# Patient Record
Sex: FEMALE | Race: ASIAN | NOT HISPANIC OR LATINO | Employment: UNEMPLOYED | URBAN - METROPOLITAN AREA
[De-identification: names, ages, dates, MRNs, and addresses within clinical notes are randomized per-mention and may not be internally consistent; named-entity substitution may affect disease eponyms.]

---

## 2021-10-25 ENCOUNTER — TELEPHONE (OUTPATIENT)
Dept: OBSTETRICS AND GYNECOLOGY | Age: 31
End: 2021-10-25

## 2023-10-18 ENCOUNTER — OFFICE VISIT (OUTPATIENT)
Dept: OBSTETRICS AND GYNECOLOGY | Age: 33
End: 2023-10-18
Payer: COMMERCIAL

## 2023-10-18 VITALS
DIASTOLIC BLOOD PRESSURE: 70 MMHG | WEIGHT: 137.6 LBS | BODY MASS INDEX: 25.32 KG/M2 | SYSTOLIC BLOOD PRESSURE: 110 MMHG | HEIGHT: 62 IN

## 2023-10-18 DIAGNOSIS — Z11.3 ROUTINE SCREENING FOR STI (SEXUALLY TRANSMITTED INFECTION): ICD-10-CM

## 2023-10-18 DIAGNOSIS — Z13.89 SCREENING FOR BLOOD OR PROTEIN IN URINE: ICD-10-CM

## 2023-10-18 DIAGNOSIS — Z87.59 HISTORY OF PLACENTA ABRUPTION: ICD-10-CM

## 2023-10-18 DIAGNOSIS — R30.0 DYSURIA: ICD-10-CM

## 2023-10-18 DIAGNOSIS — Z98.891 PREVIOUS CESAREAN SECTION: ICD-10-CM

## 2023-10-18 DIAGNOSIS — O36.80X0 ENCOUNTER TO DETERMINE FETAL VIABILITY OF PREGNANCY, SINGLE OR UNSPECIFIED FETUS: ICD-10-CM

## 2023-10-18 DIAGNOSIS — Z3A.01 7 WEEKS GESTATION OF PREGNANCY: Primary | ICD-10-CM

## 2023-10-18 PROBLEM — O45.90 PLACENTAL ABRUPTION AFFECTING DELIVERY: Status: RESOLVED | Noted: 2021-11-01 | Resolved: 2023-10-18

## 2023-10-18 LAB
BILIRUB BLD-MCNC: NEGATIVE MG/DL
GLUCOSE UR STRIP-MCNC: NEGATIVE MG/DL
KETONES UR QL: NEGATIVE
LEUKOCYTE EST, POC: NEGATIVE
NITRITE UR-MCNC: NEGATIVE MG/ML
PH UR: 6 [PH] (ref 5–8)
PROT UR STRIP-MCNC: NEGATIVE MG/DL
RBC # UR STRIP: NEGATIVE /UL
SP GR UR: 1.03 (ref 1–1.03)
UROBILINOGEN UR QL: NORMAL

## 2023-10-19 LAB
ABO GROUP BLD: NORMAL
BLD GP AB SCN SERPL QL: NEGATIVE
ERYTHROCYTE [DISTWIDTH] IN BLOOD BY AUTOMATED COUNT: 13 % (ref 11.7–15.4)
HBA1C MFR BLD: 5 % (ref 4.8–5.6)
HBV SURFACE AG SERPL QL IA: NEGATIVE
HCT VFR BLD AUTO: 36.3 % (ref 34–46.6)
HCV IGG SERPL QL IA: NON REACTIVE
HGB A MFR BLD ELPH: 97.3 % (ref 96.4–98.8)
HGB A2 MFR BLD ELPH: 2.7 % (ref 1.8–3.2)
HGB BLD-MCNC: 12.2 G/DL (ref 11.1–15.9)
HGB F MFR BLD ELPH: 0 % (ref 0–2)
HGB FRACT BLD-IMP: NORMAL
HGB S MFR BLD ELPH: 0 %
HIV 1+2 AB+HIV1 P24 AG SERPL QL IA: NON REACTIVE
MCH RBC QN AUTO: 30.6 PG (ref 26.6–33)
MCHC RBC AUTO-ENTMCNC: 33.6 G/DL (ref 31.5–35.7)
MCV RBC AUTO: 91 FL (ref 79–97)
PLATELET # BLD AUTO: 217 X10E3/UL (ref 150–450)
RBC # BLD AUTO: 3.99 X10E6/UL (ref 3.77–5.28)
RH BLD: POSITIVE
RPR SER QL: NON REACTIVE
RUBV IGG SERPL IA-ACNC: 2.74 INDEX
TSH SERPL DL<=0.005 MIU/L-ACNC: 0.5 UIU/ML (ref 0.45–4.5)
VZV IGG SER IA-ACNC: 1805 INDEX
WBC # BLD AUTO: 7.3 X10E3/UL (ref 3.4–10.8)

## 2023-10-20 LAB
BACTERIA UR CULT: NO GROWTH
BACTERIA UR CULT: NORMAL
C TRACH RRNA SPEC QL NAA+PROBE: NEGATIVE
N GONORRHOEA RRNA SPEC QL NAA+PROBE: NEGATIVE
T VAGINALIS RRNA SPEC QL NAA+PROBE: NEGATIVE

## 2023-11-17 ENCOUNTER — INITIAL PRENATAL (OUTPATIENT)
Dept: OBSTETRICS AND GYNECOLOGY | Age: 33
End: 2023-11-17
Payer: COMMERCIAL

## 2023-11-17 VITALS — SYSTOLIC BLOOD PRESSURE: 100 MMHG | DIASTOLIC BLOOD PRESSURE: 68 MMHG | BODY MASS INDEX: 24.69 KG/M2 | WEIGHT: 135 LBS

## 2023-11-17 DIAGNOSIS — Z13.89 SCREENING FOR BLOOD OR PROTEIN IN URINE: ICD-10-CM

## 2023-11-17 DIAGNOSIS — Z98.891 PREVIOUS CESAREAN SECTION: ICD-10-CM

## 2023-11-17 DIAGNOSIS — Z34.81 PRENATAL CARE, SUBSEQUENT PREGNANCY IN FIRST TRIMESTER: Primary | ICD-10-CM

## 2023-11-17 DIAGNOSIS — Z87.59 HISTORY OF PLACENTA ABRUPTION: ICD-10-CM

## 2023-11-17 PROBLEM — Z34.90 PREGNANCY: Status: ACTIVE | Noted: 2023-11-17

## 2023-11-17 LAB
BILIRUB BLD-MCNC: NEGATIVE MG/DL
GLUCOSE UR STRIP-MCNC: NEGATIVE MG/DL
KETONES UR QL: ABNORMAL
LEUKOCYTE EST, POC: NEGATIVE
NITRITE UR-MCNC: NEGATIVE MG/ML
PH UR: 6 [PH] (ref 5–8)
PROT UR STRIP-MCNC: NEGATIVE MG/DL
RBC # UR STRIP: NEGATIVE /UL
SP GR UR: 1.01 (ref 1–1.03)
UROBILINOGEN UR QL: NORMAL

## 2023-11-17 NOTE — PROGRESS NOTES
Chief Complaint   Patient presents with    Routine Prenatal Visit     Cc:  ob intake Nipt undecided wants to hold off right now LMP 23  , carrier screening neg back in   C/o nausea takes unisom and b6 meds help   Had some vomiting last night   Denies any spotting or cramping   Flu vaccine today   Would like to have another csection again with this baby        HPI: 33 y.o.  at 11w3d presents for ob intake     Vitals:    23 0903   BP: 100/68   Weight: 61.2 kg (135 lb)     Total weight gain for pregnancy:  -0.907 kg (-2 lb)    Review of systems:   Gen: negative  CV:     negative  GI: negative  :   negative  MS:    negative  Neuro: negative  Pul: negative    A/P  1. Intrauterine pregnancy at 11w3d   2. Pregnancy Risk:  HIGH RISK        Diagnoses and all orders for this visit:    1. Prenatal care, subsequent pregnancy in first trimester (Primary)    2. Screening for blood or protein in urine  -     POC Urinalysis Dipstick    3. Previous  section    4. History of placenta abruption    Other orders  -     Fluzone (or Fluarix & Flulaval for VFC) >6mos        Nutrition and weight gain were addressed.  -----------------------  PLAN:   Return in about 31 days (around 2023), or ob check.  OB intake completed  Prenatal labs reviewed  Prior  section-desires repeat  Flu vaccine today  Undecided on NIPT         Meenu Villasenor MD  2023 09:29 EST

## 2023-12-18 ENCOUNTER — ROUTINE PRENATAL (OUTPATIENT)
Dept: OBSTETRICS AND GYNECOLOGY | Age: 33
End: 2023-12-18
Payer: COMMERCIAL

## 2023-12-18 VITALS — BODY MASS INDEX: 24.87 KG/M2 | DIASTOLIC BLOOD PRESSURE: 62 MMHG | SYSTOLIC BLOOD PRESSURE: 102 MMHG | WEIGHT: 136 LBS

## 2023-12-18 DIAGNOSIS — Z34.82 PRENATAL CARE, SUBSEQUENT PREGNANCY IN SECOND TRIMESTER: Primary | ICD-10-CM

## 2023-12-18 DIAGNOSIS — Z98.891 PREVIOUS CESAREAN SECTION: ICD-10-CM

## 2023-12-18 DIAGNOSIS — Z87.59 HISTORY OF PLACENTA ABRUPTION: ICD-10-CM

## 2023-12-18 DIAGNOSIS — Z13.89 SCREENING FOR BLOOD OR PROTEIN IN URINE: ICD-10-CM

## 2023-12-18 LAB
BILIRUB BLD-MCNC: NEGATIVE MG/DL
GLUCOSE UR STRIP-MCNC: NEGATIVE MG/DL
KETONES UR QL: NEGATIVE
LEUKOCYTE EST, POC: NEGATIVE
NITRITE UR-MCNC: NEGATIVE MG/ML
PH UR: 6.5 [PH] (ref 5–8)
PROT UR STRIP-MCNC: NEGATIVE MG/DL
RBC # UR STRIP: NEGATIVE /UL
SP GR UR: 1.01 (ref 1–1.03)
UROBILINOGEN UR QL: NORMAL

## 2023-12-18 NOTE — PROGRESS NOTES
Chief Complaint   Patient presents with    Routine Prenatal Visit     Cc: ob check , upd with flu vaccine , nausea decreased denies any vag spotting or cramping , declines kesha wants to wait till anatomy scan .       HPI: 33 y.o.  at 16w3d presents for prenatal care.  Reports feeling much better.    Vitals:    23 1135   BP: 102/62   Weight: 61.7 kg (136 lb)     Total weight gain for pregnancy:  -0.454 kg (-1 lb)    Review of systems:   Gen: negative  CV:     negative  GI: negative  :   negative  MS:    negative  Neuro: negative  Pul: negative    A/P  1. Intrauterine pregnancy at 16w3d   2. Pregnancy Risk:  NORMAL        Diagnoses and all orders for this visit:    1. Prenatal care, subsequent pregnancy in second trimester (Primary)    2. Screening for blood or protein in urine  -     POC Urinalysis Dipstick    3. Previous  section    4. History of placenta abruption        Nutrition and weight gain were addressed.  -----------------------  PLAN:   Return in about 4 weeks (around 1/15/2024), or ob check and anatomy US.  Declines NIPT  Previous  section-desires repeat at 39 weeks        Meenu Villasenor MD  2023 11:48 EST

## 2024-01-15 ENCOUNTER — HOSPITAL ENCOUNTER (OUTPATIENT)
Dept: ULTRASOUND IMAGING | Facility: HOSPITAL | Age: 34
Discharge: HOME | End: 2024-01-15
Admitting: OBSTETRICS & GYNECOLOGY
Payer: COMMERCIAL

## 2024-01-15 ENCOUNTER — TRANSCRIBE ORDERS (OUTPATIENT)
Dept: ULTRASOUND IMAGING | Facility: HOSPITAL | Age: 34
End: 2024-01-15
Payer: COMMERCIAL

## 2024-01-15 ENCOUNTER — ROUTINE PRENATAL (OUTPATIENT)
Dept: OBSTETRICS AND GYNECOLOGY | Age: 34
End: 2024-01-15
Payer: COMMERCIAL

## 2024-01-15 ENCOUNTER — OFFICE VISIT (OUTPATIENT)
Dept: OBSTETRICS AND GYNECOLOGY | Facility: CLINIC | Age: 34
End: 2024-01-15
Payer: COMMERCIAL

## 2024-01-15 VITALS
WEIGHT: 144 LBS | DIASTOLIC BLOOD PRESSURE: 63 MMHG | TEMPERATURE: 97.8 F | BODY MASS INDEX: 26.5 KG/M2 | HEIGHT: 62 IN | SYSTOLIC BLOOD PRESSURE: 109 MMHG | HEART RATE: 95 BPM

## 2024-01-15 VITALS — WEIGHT: 142 LBS | DIASTOLIC BLOOD PRESSURE: 64 MMHG | SYSTOLIC BLOOD PRESSURE: 104 MMHG | BODY MASS INDEX: 25.97 KG/M2

## 2024-01-15 DIAGNOSIS — N88.3 SHORT CERVIX: Primary | ICD-10-CM

## 2024-01-15 DIAGNOSIS — Z98.891 PREVIOUS CESAREAN SECTION: ICD-10-CM

## 2024-01-15 DIAGNOSIS — Z87.59 HISTORY OF PLACENTA ABRUPTION: ICD-10-CM

## 2024-01-15 DIAGNOSIS — Z34.82 PRENATAL CARE, SUBSEQUENT PREGNANCY IN SECOND TRIMESTER: Primary | ICD-10-CM

## 2024-01-15 DIAGNOSIS — O26.879 SHORT CERVIX AFFECTING PREGNANCY: ICD-10-CM

## 2024-01-15 DIAGNOSIS — O26.879 SHORT CERVIX AFFECTING PREGNANCY: Primary | ICD-10-CM

## 2024-01-15 DIAGNOSIS — N88.3 SHORT CERVIX: ICD-10-CM

## 2024-01-15 DIAGNOSIS — Z13.89 SCREENING FOR BLOOD OR PROTEIN IN URINE: ICD-10-CM

## 2024-01-15 PROCEDURE — 76817 TRANSVAGINAL US OBSTETRIC: CPT

## 2024-01-15 PROCEDURE — 0502F SUBSEQUENT PRENATAL CARE: CPT | Performed by: OBSTETRICS & GYNECOLOGY

## 2024-01-15 PROCEDURE — 76811 OB US DETAILED SNGL FETUS: CPT

## 2024-01-15 RX ORDER — PROGESTERONE 200 MG/1
200 CAPSULE ORAL
Qty: 90 CAPSULE | Refills: 5 | Status: SHIPPED | OUTPATIENT
Start: 2024-01-15

## 2024-01-15 NOTE — PROGRESS NOTES
Pt reports that she is doing well and denies vaginal bleeding, cramping, contractions or LOF at this time. Reports feeling flutters at this point in pregnancy. Reviewed when to call OB office or present to L&D for evaluation with symptoms such as decreased fetal movement, vaginal bleeding, LOF or ctxs. Pt verbalized understanding. Denies HA, visual changes or epigastric pain. Denies any additional complaints at time of appointment. Next OB appointment scheduled for 2/9.    Vitals:    01/15/24 1025   Temp: 97.8 °F (36.6 °C)

## 2024-01-15 NOTE — LETTER
January 15, 2024     Meenu Villasenor MD  [address] [facility]  [address] [place] [address]    Patient: Marysol HILLIARD   YOB: 1990   Date of Visit: 1/15/2024       Dear Meenu Villasenor MD,    Thank you for referring Marysol HILLIARD to me for evaluation. Below is a copy of my consult note.    If you have questions, please do not hesitate to call me. I look forward to following Marysol along with you.         Sincerely,        Zuleyka Lin MD    January 15, 2024     Meenu Villasenor MD  [address] [id number]    Patient: Marysol HILLIARD   YOB: 1990   Date of Visit: 1/15/2024       Dear Meenu Villasenor MD,    Thank you for referring Marysol HILLIARD to me for evaluation. Below is a copy of my consult note.    If you have questions, please do not hesitate to call me. I look forward to following Marysol along with you.         Sincerely,        Zuleyka Lin MD      MATERNAL FETAL MEDICINE Consult Note    Dear Dr Meenu Villasenor MD:    Thank you for your kind referral of Marysol HILLIARD.  As you know, she is a 33 y.o.   at  20 3/7 weeks gestation (Estimated Date of Delivery: 24). This is a consult.      Her antepartum course is complicated by:  Short cervix, hx of term delivery    Aneuploidy Screening: none seen    HPI: Today, she denies headache, blurry vision, RUQ pain. No vaginal bleeding, no contractions.     Review of History:  Past Medical History:   Diagnosis Date   • Placental abruption      Past Surgical History:   Procedure Laterality Date   •  SECTION N/A 10/20/2021    Procedure:  SECTION PRIMARY;  Surgeon: Meenu Villasenor MD;  Location: [facility] DELIVERY;  Service: Obstetrics/Gynecology;  Laterality: N/A;   • WISDOM TOOTH EXTRACTION         Social History     Socioeconomic History   • Marital status:    Tobacco Use   • Smoking status: Never     Passive exposure: Never   • Smokeless tobacco: Never  "  Vaping Use   • Vaping Use: Never used   Substance and Sexual Activity   • Alcohol use: Not Currently   • Drug use: Never   • Sexual activity: Yes     Partners: Male     Birth control/protection: None     Comment: no BC     Family History   Problem Relation Age of Onset   • Breast cancer Neg Hx    • Ovarian cancer Neg Hx    • Uterine cancer Neg Hx    • Colon cancer Neg Hx       Allergies   Allergen Reactions   • Adhesive Tape Irritability      Current Outpatient Medications on File Prior to Visit   Medication Sig Dispense Refill   • doxylamine (UNISOM) 25 MG tablet Take 1 tablet by mouth At Night As Needed for Sleep.     • prenatal vitamin (prenatal, CLASSIC, vitamin) tablet Take  by mouth Daily.       No current facility-administered medications on file prior to visit.        Past obstetric, gynecological, medical, surgical, family and social history reviewed.  Relevant lab work and imaging reviewed.    Review of systems  Constitutional:  denies fever, chills, malaise.   ENT/Mouth:  denies sore throat, tinnitus  Eyes: denies vision changes/pain  CV:  denies chest pain  Respiratory:  denies cough/SOB  GI:  denies N/V, diarrhea, abdominal pain.    :   denies dysuria  Skin:  denies lesions or pruritus   Neuro:  denies weakness, focal neurologic symptoms    Vitals:    01/15/24 1025   BP: 109/63   BP Location: Right arm   Patient Position: Sitting   Pulse: 95   Temp: 97.8 °F (36.6 °C)   TempSrc: Temporal   Weight: 65.3 kg (144 lb)   Height: 157.5 cm (62\")       PHYSICAL EXAM   GENERAL: Not in acute distress, AAOx3, pleasant  CARDIO: regular rate and rhythm  PULM: symmetric chest rise, speaking in complete sentences without difficulty  NEURO: awake, alert and oriented to person, place, and time  ABDOMINAL: No fundal tenderness, no rebound or guarding, gravid  EXTREMITIES: no bilateral lower extremity edema/tenderness  SKIN: Warm, well-perfused      ULTRASOUND   Please view full ultrasound note on Imaging tab in " ViewPoint.  Breech presentation.  Posterior placenta.    g (AC 51%)  Normal appearing anatomy with suboptimal spine and kidneys due to fetal position.  Cervical length 1.8 cm with funnel.     ASSESSMENT/COUNSELIN y.o.   at  20 3/7 weeks gestation (Estimated Date of Delivery: 24).     -Pregnancy  [ X ] stable  [   ] improving [  ] worsening    There are no diagnoses linked to this encounter.       Short cervix, 1.8 cm today in our office, ~7 mm in Dr. Villasenor's office upon reviewing images.   I used the [employer] website calculator taking into account maternal history and cervical length.  Below are the likelihood of her delivering  based on these indicators.  Her cervix is very dynamic so used shortest here and and Dr. Villasenor's office in counseling.      7 mm cervix:  Risk of delivery before 28 weeks:  8.3 %  Risk of delivery before 31 weeks:  12.4 %  Risk of delivery before 34 weeks:  19.3 %  Risk of delivery before 37 weeks:  43.4 %    1.8 mm cervix:  Risk of delivery before 28 weeks:  0.6 %  Risk of delivery before 31 weeks:  1.5 %  Risk of delivery before 34 weeks:  3.3 %  Risk of delivery before 37 weeks:  8.9 %    According to ACOG, incidentally detected short cervical length in the second trimester in the absence of a prior madsen  birth is not diagnostic of cervical insufficiency, and cerclage is not indicated in this setting. Vaginal progesterone is recommended as a management option to reduce the risk of  birth in asymptomatic women with a madsen gestation without a prior  birth with an incidentally identified very short cervical length. HOWEVER, we discussed based on her cervix <1 cm at Dr. Villasenor's office there is some data to offering cerclage for this indication and I would offer given what I am seeing on US, understanding that she doesn't meet the strict criteria.  We had a long discussion about cerclage vs surveillance.  We did discuss the  cerclage procedure and risks associated.  I discussed risks of a cerclage including risk of bleeding, infection, damage to bowel/bladder, PPROM, fetal demise.  Certainly, if her cervix continues to shorten, I would recommend weighing the risk of a more difficult/risky cerclage with risk of unnecessary procedure.   We discussed modified bedrest, activity restriction, pelvic rest.  She will do vaginal progesterone starting today.    They asked very good questions and we had a long discussion.  They had a good understanding of the fact that I am reassured by her term delivery last time, but her funnel down to at lowest 7 mm is concerning to me today--although we are getting 1.8 mm.  She did agree to start vaginal progesterone and understands modified activity.  She will have a very low threshold to come in and be seen.  We will see her short interval in a few days to get an idea of what the trajectory of her cervix is going to be and then plan surveillance based on that.  She understands the unlikely scenario that her cervix could shorten acutely/rapidly in the next couple days and lead to a previable delivery.  She understands surveillance for possible cerclage stops at 24 weeks.  I recommend modified activity with vaginal progesterone until 37 weeks.       Of all women with asymptomatic short cervix <15 mm in the midtrimester, only approximately 20% deliver , especially when it is dynamic.   She is picking up progesterone from [facility] before she leaves today.  All her questions were answered.      I did perform an exam for completeness.  Her cervix appeared closed on speculum exam without evidence of bag, etc.  She was digitally closed, as well, with cervix that did not feel soft, which gave me a small amount of reassurance.      Summary of Plan  -Discussed modified bedrest until 37 weeks, pelvic rest, as well  -Started vaginal progesterone today.   -Return short interval on Thursday to evaluate  cervix and decided management plan--if stable to increased CL, will do weekly     Follow-up: Thursday    Thank you for the consult and opportunity to care for this patient.  Please feel free to reach out with any questions or concerns.      I spent 40 minutes caring for this patient on this date of service. This time includes time spent by me in the following activities: preparing for the visit, reviewing tests, obtaining and/or reviewing a separately obtained history, performing a medically appropriate examination and/or evaluation, counseling and educating the patient/family/caregiver and independently interpreting results and communicating that information with the patient/family/caregiver with greater than 50% spent in counseling and coordination of care.       I spent 4 minutes on the separately reported service of US imaging not included in the time used to support the E/M service also reported today.      Zuleyka Lin MD FACOG  Maternal Fetal Medicine-[facility]  Office: [phone]  [email or web address]

## 2024-01-15 NOTE — PROGRESS NOTES
Chief Complaint   Patient presents with    Routine Prenatal Visit     Cc:   ob check with anatomy doing well today , upd with flu vaccine , does not know the gender today got an envelope will do baby reveal to find out , had couple of episodes lower back pain in December was unable to move but its better now , nausea only when driving in the car .       HPI: 33 y.o.  at 20w3d presents for prenatal care    Vitals:    01/15/24 0923   BP: 104/64   Weight: 64.4 kg (142 lb)     Total weight gain for pregnancy:  2.268 kg (5 lb)    Review of systems:   Gen: negative  CV:     negative  GI: negative  :   negative and good fetal movement noted   MS:    negative  Neuro: negative  Pul: negative    A/P  1. Intrauterine pregnancy at 20w3d   2. Pregnancy Risk:  HIGH RISK        Diagnoses and all orders for this visit:    1. Prenatal care, subsequent pregnancy in second trimester (Primary)    2. Screening for blood or protein in urine  -     POC Urinalysis Dipstick    3. Short cervix affecting pregnancy  -     Ambulatory Referral to [facility]/Perinatology    4. Previous  section    5. History of placenta abruption          -----------------------  PLAN:   Return in about 25 days (around 2024), or ob check and repeat anatomy.  Short cervix today on ultrasound-discussed with [facility]-will see today  Normal but incomplete anatomy-repeat 4 weeks      Meenu Villasenor MD  1/15/2024 09:48 EST

## 2024-01-15 NOTE — PROGRESS NOTES
MATERNAL FETAL MEDICINE Consult Note    Dear Dr Meenu Villasenor MD:    Thank you for your kind referral of Marysol HILLIARD.  As you know, she is a 33 y.o.   at  20 3/7 weeks gestation (Estimated Date of Delivery: 24). This is a consult.      Her antepartum course is complicated by:  Short cervix, hx of term delivery    Aneuploidy Screening: none seen    HPI: Today, she denies headache, blurry vision, RUQ pain. No vaginal bleeding, no contractions.     Review of History:  Past Medical History:   Diagnosis Date    Placental abruption      Past Surgical History:   Procedure Laterality Date     SECTION N/A 10/20/2021    Procedure:  SECTION PRIMARY;  Surgeon: Meenu Villasenor MD;  Location: [facility] LABOR DELIVERY;  Service: Obstetrics/Gynecology;  Laterality: N/A;    WISDOM TOOTH EXTRACTION         Social History     Socioeconomic History    Marital status:    Tobacco Use    Smoking status: Never     Passive exposure: Never    Smokeless tobacco: Never   Vaping Use    Vaping Use: Never used   Substance and Sexual Activity    Alcohol use: Not Currently    Drug use: Never    Sexual activity: Yes     Partners: Male     Birth control/protection: None     Comment: no BC     Family History   Problem Relation Age of Onset    Breast cancer Neg Hx     Ovarian cancer Neg Hx     Uterine cancer Neg Hx     Colon cancer Neg Hx       Allergies   Allergen Reactions    Adhesive Tape Irritability      Current Outpatient Medications on File Prior to Visit   Medication Sig Dispense Refill    doxylamine (UNISOM) 25 MG tablet Take 1 tablet by mouth At Night As Needed for Sleep.      prenatal vitamin (prenatal, CLASSIC, vitamin) tablet Take  by mouth Daily.       No current facility-administered medications on file prior to visit.        Past obstetric, gynecological, medical, surgical, family and social history reviewed.  Relevant lab work and imaging reviewed.    Review of systems  Constitutional:   "denies fever, chills, malaise.   ENT/Mouth:  denies sore throat, tinnitus  Eyes: denies vision changes/pain  CV:  denies chest pain  Respiratory:  denies cough/SOB  GI:  denies N/V, diarrhea, abdominal pain.    :   denies dysuria  Skin:  denies lesions or pruritus   Neuro:  denies weakness, focal neurologic symptoms    Vitals:    01/15/24 1025   BP: 109/63   BP Location: Right arm   Patient Position: Sitting   Pulse: 95   Temp: 97.8 °F (36.6 °C)   TempSrc: Temporal   Weight: 65.3 kg (144 lb)   Height: 157.5 cm (62\")       PHYSICAL EXAM   GENERAL: Not in acute distress, AAOx3, pleasant  CARDIO: regular rate and rhythm  PULM: symmetric chest rise, speaking in complete sentences without difficulty  NEURO: awake, alert and oriented to person, place, and time  ABDOMINAL: No fundal tenderness, no rebound or guarding, gravid  EXTREMITIES: no bilateral lower extremity edema/tenderness  SKIN: Warm, well-perfused      ULTRASOUND   Please view full ultrasound note on Imaging tab in ViewPoint.  Breech presentation.  Posterior placenta.    g (AC 51%)  Normal appearing anatomy with suboptimal spine and kidneys due to fetal position.  Cervical length 1.8 cm with funnel.     ASSESSMENT/COUNSELIN y.o.   at  20 3/7 weeks gestation (Estimated Date of Delivery: 24).     -Pregnancy  [ X ] stable  [   ] improving [  ] worsening    There are no diagnoses linked to this encounter.       Short cervix, 1.8 cm today in our office, ~7 mm in Dr. Villasenor's office upon reviewing images.   I used the Fetal Foundation website calculator taking into account maternal history and cervical length.  Below are the likelihood of her delivering  based on these indicators.  Her cervix is very dynamic so used shortest here and and Dr. Villasenor's office in counseling.      7 mm cervix:  Risk of delivery before 28 weeks:  8.3 %  Risk of delivery before 31 weeks:  12.4 %  Risk of delivery before 34 weeks:  19.3 %  Risk of delivery " before 37 weeks:  43.4 %    1.8 mm cervix:  Risk of delivery before 28 weeks:  0.6 %  Risk of delivery before 31 weeks:  1.5 %  Risk of delivery before 34 weeks:  3.3 %  Risk of delivery before 37 weeks:  8.9 %    According to ACOG, incidentally detected short cervical length in the second trimester in the absence of a prior madsen  birth is not diagnostic of cervical insufficiency, and cerclage is not indicated in this setting. Vaginal progesterone is recommended as a management option to reduce the risk of  birth in asymptomatic women with a madsen gestation without a prior  birth with an incidentally identified very short cervical length. HOWEVER, we discussed based on her cervix <1 cm at Dr. Villasenor's office there is some data to offering cerclage for this indication and I would offer given what I am seeing on US, understanding that she doesn't meet the strict criteria.  We had a long discussion about cerclage vs surveillance.  We did discuss the cerclage procedure and risks associated.  I discussed risks of a cerclage including risk of bleeding, infection, damage to bowel/bladder, PPROM, fetal demise.  Certainly, if her cervix continues to shorten, I would recommend weighing the risk of a more difficult/risky cerclage with risk of unnecessary procedure.   We discussed modified bedrest, activity restriction, pelvic rest.  She will do vaginal progesterone starting today.    They asked very good questions and we had a long discussion.  They had a good understanding of the fact that I am reassured by her term delivery last time, but her funnel down to at lowest 7 mm is concerning to me today--although we are getting 1.8 mm.  She did agree to start vaginal progesterone and understands modified activity.  She will have a very low threshold to come in and be seen.  We will see her short interval in a few days to get an idea of what the trajectory of her cervix is going to be and then plan  surveillance based on that.  She understands the unlikely scenario that her cervix could shorten acutely/rapidly in the next couple days and lead to a previable delivery.  She understands surveillance for possible cerclage stops at 24 weeks.  I recommend modified activity with vaginal progesterone until 37 weeks.       Of all women with asymptomatic short cervix <15 mm in the midtrimester, only approximately 20% deliver , especially when it is dynamic.   She is picking up progesterone from [facility] before she leaves today.  All her questions were answered.      I did perform an exam for completeness.  Her cervix appeared closed on speculum exam without evidence of bag, etc.  She was digitally closed, as well, with cervix that did not feel soft, which gave me a small amount of reassurance.      Summary of Plan  -Discussed modified bedrest until 37 weeks, pelvic rest, as well  -Started vaginal progesterone today.   -Return short interval on Thursday to evaluate cervix and decided management plan--if stable to increased CL, will do weekly     Follow-up: Thursday    Thank you for the consult and opportunity to care for this patient.  Please feel free to reach out with any questions or concerns.      I spent 40 minutes caring for this patient on this date of service. This time includes time spent by me in the following activities: preparing for the visit, reviewing tests, obtaining and/or reviewing a separately obtained history, performing a medically appropriate examination and/or evaluation, counseling and educating the patient/family/caregiver and independently interpreting results and communicating that information with the patient/family/caregiver with greater than 50% spent in counseling and coordination of care.       I spent 4 minutes on the separately reported service of US imaging not included in the time used to support the E/M service also reported today.      Zuleyka Lin MD  [facility] [place]  Maternal Fetal Medicine-[facility]  Office: [phone]  [email or web address]

## 2024-01-18 ENCOUNTER — ANESTHESIA EVENT (OUTPATIENT)
Dept: PERIOP | Facility: HOSPITAL | Age: 34
End: 2024-01-18
Payer: COMMERCIAL

## 2024-01-18 ENCOUNTER — HOSPITAL ENCOUNTER (INPATIENT)
Facility: HOSPITAL | Age: 34
LOS: 1 days | Discharge: HOME | End: 2024-01-19
Attending: OBSTETRICS & GYNECOLOGY | Admitting: OBSTETRICS & GYNECOLOGY
Payer: COMMERCIAL

## 2024-01-18 ENCOUNTER — TRANSCRIBE ORDERS (OUTPATIENT)
Dept: ULTRASOUND IMAGING | Facility: HOSPITAL | Age: 34
End: 2024-01-18
Payer: COMMERCIAL

## 2024-01-18 ENCOUNTER — HOSPITAL ENCOUNTER (OUTPATIENT)
Dept: ULTRASOUND IMAGING | Facility: HOSPITAL | Age: 34
Discharge: HOME | End: 2024-01-18
Admitting: OBSTETRICS & GYNECOLOGY
Payer: COMMERCIAL

## 2024-01-18 ENCOUNTER — HOSPITAL ENCOUNTER (OUTPATIENT)
Facility: HOSPITAL | Age: 34
Setting detail: HOSPITAL OUTPATIENT SURGERY
End: 2024-01-18
Attending: OBSTETRICS & GYNECOLOGY | Admitting: OBSTETRICS & GYNECOLOGY
Payer: COMMERCIAL

## 2024-01-18 ENCOUNTER — OFFICE VISIT (OUTPATIENT)
Dept: OBSTETRICS AND GYNECOLOGY | Facility: CLINIC | Age: 34
End: 2024-01-18
Payer: COMMERCIAL

## 2024-01-18 VITALS
TEMPERATURE: 97.8 F | HEIGHT: 62 IN | DIASTOLIC BLOOD PRESSURE: 67 MMHG | WEIGHT: 139 LBS | SYSTOLIC BLOOD PRESSURE: 107 MMHG | BODY MASS INDEX: 25.58 KG/M2 | HEART RATE: 107 BPM

## 2024-01-18 DIAGNOSIS — N88.3 SHORT CERVIX: Primary | ICD-10-CM

## 2024-01-18 DIAGNOSIS — O26.879 SHORT CERVIX AFFECTING PREGNANCY: Primary | ICD-10-CM

## 2024-01-18 DIAGNOSIS — N88.3 SHORT CERVIX: ICD-10-CM

## 2024-01-18 LAB
ABO GROUP BLD: NORMAL
ALBUMIN SERPL-MCNC: 3.5 G/DL (ref 3.5–5.2)
ALBUMIN/GLOB SERPL: 1.3 G/DL
ALP SERPL-CCNC: 61 U/L (ref 39–117)
ALT SERPL W P-5'-P-CCNC: 6 U/L (ref 1–33)
ANION GAP SERPL CALCULATED.3IONS-SCNC: 10.4 MMOL/L (ref 5–15)
AST SERPL-CCNC: 11 U/L (ref 1–32)
BASOPHILS # BLD AUTO: 0.01 10*3/MM3 (ref 0–0.2)
BASOPHILS NFR BLD AUTO: 0.1 % (ref 0–1.5)
BILIRUB SERPL-MCNC: <0.2 MG/DL (ref 0–1.2)
BLD GP AB SCN SERPL QL: NEGATIVE
BUN SERPL-MCNC: 7 MG/DL (ref 6–20)
BUN/CREAT SERPL: 11.7 (ref 7–25)
CALCIUM SPEC-SCNC: 8.7 MG/DL (ref 8.6–10.5)
CHLORIDE SERPL-SCNC: 102 MMOL/L (ref 98–107)
CO2 SERPL-SCNC: 20.6 MMOL/L (ref 22–29)
CREAT SERPL-MCNC: 0.6 MG/DL (ref 0.57–1)
DEPRECATED RDW RBC AUTO: 45.6 FL (ref 37–54)
EGFRCR SERPLBLD CKD-EPI 2021: 122.5 ML/MIN/1.73
EOSINOPHIL # BLD AUTO: 0.02 10*3/MM3 (ref 0–0.4)
EOSINOPHIL NFR BLD AUTO: 0.3 % (ref 0.3–6.2)
ERYTHROCYTE [DISTWIDTH] IN BLOOD BY AUTOMATED COUNT: 13.4 % (ref 12.3–15.4)
GLOBULIN UR ELPH-MCNC: 2.7 GM/DL
GLUCOSE SERPL-MCNC: 97 MG/DL (ref 65–99)
HCT VFR BLD AUTO: 35.8 % (ref 34–46.6)
HGB BLD-MCNC: 12.1 G/DL (ref 12–15.9)
LYMPHOCYTES # BLD AUTO: 0.99 10*3/MM3 (ref 0.7–3.1)
LYMPHOCYTES NFR BLD AUTO: 12.7 % (ref 19.6–45.3)
MCH RBC QN AUTO: 31.4 PG (ref 26.6–33)
MCHC RBC AUTO-ENTMCNC: 33.8 G/DL (ref 31.5–35.7)
MCV RBC AUTO: 93 FL (ref 79–97)
MONOCYTES # BLD AUTO: 0.51 10*3/MM3 (ref 0.1–0.9)
MONOCYTES NFR BLD AUTO: 6.5 % (ref 5–12)
NEUTROPHILS NFR BLD AUTO: 6.25 10*3/MM3 (ref 1.7–7)
NEUTROPHILS NFR BLD AUTO: 80.1 % (ref 42.7–76)
PLATELET # BLD AUTO: 143 10*3/MM3 (ref 140–450)
PMV BLD AUTO: 9.7 FL (ref 6–12)
POTASSIUM SERPL-SCNC: 4.3 MMOL/L (ref 3.5–5.2)
PROT SERPL-MCNC: 6.2 G/DL (ref 6–8.5)
RBC # BLD AUTO: 3.85 10*6/MM3 (ref 3.77–5.28)
RH BLD: POSITIVE
SODIUM SERPL-SCNC: 133 MMOL/L (ref 136–145)
T&S EXPIRATION DATE: NORMAL
WBC NRBC COR # BLD AUTO: 7.8 10*3/MM3 (ref 3.4–10.8)

## 2024-01-18 PROCEDURE — 86850 RBC ANTIBODY SCREEN: CPT | Performed by: OBSTETRICS & GYNECOLOGY

## 2024-01-18 PROCEDURE — 86901 BLOOD TYPING SEROLOGIC RH(D): CPT | Performed by: OBSTETRICS & GYNECOLOGY

## 2024-01-18 PROCEDURE — 76815 OB US LIMITED FETUS(S): CPT

## 2024-01-18 PROCEDURE — 25010000002 CEFAZOLIN IN DEXTROSE 2-4 GM/100ML-% SOLUTION: Performed by: OBSTETRICS & GYNECOLOGY

## 2024-01-18 PROCEDURE — 85025 COMPLETE CBC W/AUTO DIFF WBC: CPT | Performed by: OBSTETRICS & GYNECOLOGY

## 2024-01-18 PROCEDURE — 86900 BLOOD TYPING SEROLOGIC ABO: CPT | Performed by: OBSTETRICS & GYNECOLOGY

## 2024-01-18 PROCEDURE — S0260 H&P FOR SURGERY: HCPCS | Performed by: OBSTETRICS & GYNECOLOGY

## 2024-01-18 PROCEDURE — 76817 TRANSVAGINAL US OBSTETRIC: CPT

## 2024-01-18 PROCEDURE — 80053 COMPREHEN METABOLIC PANEL: CPT | Performed by: OBSTETRICS & GYNECOLOGY

## 2024-01-18 RX ORDER — SODIUM CHLORIDE 0.9 % (FLUSH) 0.9 %
10 SYRINGE (ML) INJECTION AS NEEDED
Status: CANCELLED | OUTPATIENT
Start: 2024-01-18

## 2024-01-18 RX ORDER — ONDANSETRON 4 MG/1
8 TABLET, ORALLY DISINTEGRATING ORAL EVERY 8 HOURS PRN
Status: DISCONTINUED | OUTPATIENT
Start: 2024-01-18 | End: 2024-01-19 | Stop reason: HOSPADM

## 2024-01-18 RX ORDER — LIDOCAINE HYDROCHLORIDE 10 MG/ML
0.5 INJECTION, SOLUTION INFILTRATION; PERINEURAL ONCE AS NEEDED
Status: DISCONTINUED | OUTPATIENT
Start: 2024-01-18 | End: 2024-01-19 | Stop reason: HOSPADM

## 2024-01-18 RX ORDER — BISACODYL 10 MG
10 SUPPOSITORY, RECTAL RECTAL DAILY PRN
Status: DISCONTINUED | OUTPATIENT
Start: 2024-01-18 | End: 2024-01-19 | Stop reason: HOSPADM

## 2024-01-18 RX ORDER — CEFAZOLIN SODIUM 2 G/100ML
2000 INJECTION, SOLUTION INTRAVENOUS EVERY 8 HOURS
Status: DISCONTINUED | OUTPATIENT
Start: 2024-01-18 | End: 2024-01-19 | Stop reason: HOSPADM

## 2024-01-18 RX ORDER — CALCIUM CARBONATE 500 MG/1
2 TABLET, CHEWABLE ORAL DAILY PRN
Status: CANCELLED | OUTPATIENT
Start: 2024-01-18

## 2024-01-18 RX ORDER — LIDOCAINE HYDROCHLORIDE 10 MG/ML
0.5 INJECTION, SOLUTION INFILTRATION; PERINEURAL ONCE AS NEEDED
Status: CANCELLED | OUTPATIENT
Start: 2024-01-18

## 2024-01-18 RX ORDER — CEFAZOLIN SODIUM IN 0.9 % NACL 3 G/100 ML
3000 INTRAVENOUS SOLUTION, PIGGYBACK (ML) INTRAVENOUS EVERY 8 HOURS
Status: DISCONTINUED | OUTPATIENT
Start: 2024-01-18 | End: 2024-01-18

## 2024-01-18 RX ORDER — SODIUM CHLORIDE 0.9 % (FLUSH) 0.9 %
10 SYRINGE (ML) INJECTION EVERY 12 HOURS SCHEDULED
Status: DISCONTINUED | OUTPATIENT
Start: 2024-01-18 | End: 2024-01-19 | Stop reason: HOSPADM

## 2024-01-18 RX ORDER — DOCUSATE SODIUM 100 MG/1
100 CAPSULE, LIQUID FILLED ORAL 2 TIMES DAILY PRN
Status: DISCONTINUED | OUTPATIENT
Start: 2024-01-18 | End: 2024-01-19 | Stop reason: HOSPADM

## 2024-01-18 RX ORDER — BISACODYL 10 MG
10 SUPPOSITORY, RECTAL RECTAL DAILY PRN
Status: CANCELLED | OUTPATIENT
Start: 2024-01-18

## 2024-01-18 RX ORDER — SODIUM CHLORIDE 0.9 % (FLUSH) 0.9 %
10 SYRINGE (ML) INJECTION EVERY 12 HOURS SCHEDULED
Status: CANCELLED | OUTPATIENT
Start: 2024-01-18

## 2024-01-18 RX ORDER — CALCIUM CARBONATE 500 MG/1
2 TABLET, CHEWABLE ORAL DAILY PRN
Status: DISCONTINUED | OUTPATIENT
Start: 2024-01-18 | End: 2024-01-19 | Stop reason: HOSPADM

## 2024-01-18 RX ORDER — SODIUM CHLORIDE 9 MG/ML
40 INJECTION, SOLUTION INTRAVENOUS AS NEEDED
Status: DISCONTINUED | OUTPATIENT
Start: 2024-01-18 | End: 2024-01-19 | Stop reason: HOSPADM

## 2024-01-18 RX ORDER — DOCUSATE SODIUM 100 MG/1
100 CAPSULE, LIQUID FILLED ORAL 2 TIMES DAILY PRN
Status: CANCELLED | OUTPATIENT
Start: 2024-01-18

## 2024-01-18 RX ORDER — INDOMETHACIN 50 MG/1
50 CAPSULE ORAL EVERY 8 HOURS
Status: DISCONTINUED | OUTPATIENT
Start: 2024-01-18 | End: 2024-01-19 | Stop reason: HOSPADM

## 2024-01-18 RX ORDER — ONDANSETRON 4 MG/1
8 TABLET, ORALLY DISINTEGRATING ORAL EVERY 8 HOURS PRN
Status: CANCELLED | OUTPATIENT
Start: 2024-01-18

## 2024-01-18 RX ORDER — SODIUM CHLORIDE 9 MG/ML
40 INJECTION, SOLUTION INTRAVENOUS AS NEEDED
Status: CANCELLED | OUTPATIENT
Start: 2024-01-18

## 2024-01-18 RX ORDER — ACETAMINOPHEN 325 MG/1
650 TABLET ORAL EVERY 4 HOURS PRN
Status: DISCONTINUED | OUTPATIENT
Start: 2024-01-18 | End: 2024-01-19 | Stop reason: HOSPADM

## 2024-01-18 RX ORDER — SODIUM CHLORIDE 0.9 % (FLUSH) 0.9 %
10 SYRINGE (ML) INJECTION AS NEEDED
Status: DISCONTINUED | OUTPATIENT
Start: 2024-01-18 | End: 2024-01-19 | Stop reason: HOSPADM

## 2024-01-18 RX ORDER — ONDANSETRON 2 MG/ML
4 INJECTION INTRAMUSCULAR; INTRAVENOUS EVERY 8 HOURS PRN
Status: DISCONTINUED | OUTPATIENT
Start: 2024-01-18 | End: 2024-01-19 | Stop reason: HOSPADM

## 2024-01-18 RX ORDER — ONDANSETRON 2 MG/ML
4 INJECTION INTRAMUSCULAR; INTRAVENOUS EVERY 8 HOURS PRN
Status: CANCELLED | OUTPATIENT
Start: 2024-01-18

## 2024-01-18 RX ORDER — ACETAMINOPHEN 325 MG/1
650 TABLET ORAL EVERY 4 HOURS PRN
Status: CANCELLED | OUTPATIENT
Start: 2024-01-18

## 2024-01-18 RX ADMIN — INDOMETHACIN 50 MG: 50 CAPSULE ORAL at 14:50

## 2024-01-18 RX ADMIN — CEFAZOLIN SODIUM 2000 MG: 2 INJECTION, SOLUTION INTRAVENOUS at 22:36

## 2024-01-18 RX ADMIN — CEFAZOLIN SODIUM 2000 MG: 2 INJECTION, SOLUTION INTRAVENOUS at 14:49

## 2024-01-18 RX ADMIN — INDOMETHACIN 50 MG: 50 CAPSULE ORAL at 22:36

## 2024-01-18 NOTE — LETTER
2024     Meenu Villasenor MD  [address] [facility]  [address]  [place] [id number]    Patient: Marysol HILLIARD   YOB: 1990   Date of Visit: 2024       Dear Meenu Villasenor MD,    Thank you for referring Marysol HILLIARD to me for evaluation. Below is a copy of my consult note.    If you have questions, please do not hesitate to call me. I look forward to following Marysol along with you.         Sincerely,        Zuleyka Lin MD      MATERNAL FETAL MEDICINE Consult Note    Dear Dr Meenu Villasenor MD:    Thank you for your kind referral of Marysol HILLIARD.  As you know, she is a 33 y.o.   at  20 6/7 weeks gestation (Estimated Date of Delivery: 24). This is a consult.      Her antepartum course is complicated by:  Short cervix, hx of term delivery, progressively shortening    Aneuploidy Screening: none seen    HPI: Today, she denies headache, blurry vision, RUQ pain. No vaginal bleeding, no contractions.     Review of History:  Past Medical History:   Diagnosis Date   • Placental abruption      Past Surgical History:   Procedure Laterality Date   •  SECTION N/A 10/20/2021    Procedure:  SECTION PRIMARY;  Surgeon: Meenu Villasenor MD;  Location: [facility] LABOR DELIVERY;  Service: Obstetrics/Gynecology;  Laterality: N/A;   • WISDOM TOOTH EXTRACTION         Social History     Socioeconomic History   • Marital status:    Tobacco Use   • Smoking status: Never     Passive exposure: Never   • Smokeless tobacco: Never   Vaping Use   • Vaping Use: Never used   Substance and Sexual Activity   • Alcohol use: Not Currently   • Drug use: Never   • Sexual activity: Yes     Partners: Male     Birth control/protection: None     Comment: no BC     Family History   Problem Relation Age of Onset   • Breast cancer Neg Hx    • Ovarian cancer Neg Hx    • Uterine cancer Neg Hx    • Colon cancer Neg Hx       Allergies   Allergen Reactions   • Adhesive Tape  "Irritability      No current facility-administered medications on file prior to visit.     Current Outpatient Medications on File Prior to Visit   Medication Sig Dispense Refill   • doxylamine (UNISOM) 25 MG tablet Take 1 tablet by mouth At Night As Needed for Sleep.     • prenatal vitamin (prenatal, CLASSIC, vitamin) tablet Take  by mouth Daily.     • Progesterone (PROMETRIUM) 200 MG capsule Insert 1 capsule into the vagina every night at bedtime. 90 capsule 5        Past obstetric, gynecological, medical, surgical, family and social history reviewed.  Relevant lab work and imaging reviewed.    Review of systems  Constitutional:  denies fever, chills, malaise.   ENT/Mouth:  denies sore throat, tinnitus  Eyes: denies vision changes/pain  CV:  denies chest pain  Respiratory:  denies cough/SOB  GI:  denies N/V, diarrhea, abdominal pain.    :   denies dysuria  Skin:  denies lesions or pruritus   Neuro:  denies weakness, focal neurologic symptoms    Vitals:    24 1148   BP: 107/67   BP Location: Right arm   Patient Position: Sitting   Pulse: 107   Temp: 97.8 °F (36.6 °C)   TempSrc: Temporal   Weight: 63 kg (139 lb)   Height: 157.5 cm (62\")       PHYSICAL EXAM   GENERAL: Not in acute distress, AAOx3, pleasant  CARDIO: regular rate and rhythm  PULM: symmetric chest rise, speaking in complete sentences without difficulty  NEURO: awake, alert and oriented to person, place, and time  ABDOMINAL: No fundal tenderness, no rebound or guarding, gravid  EXTREMITIES: no bilateral lower extremity edema/tenderness  SKIN: Warm, well-perfused      ULTRASOUND   Please view full ultrasound note on Imaging tab in ViewPoint.  Cephalic presentation.  Posterior placenta.  MVP 4.3 cm, which is normal.   Follow up anatomy appears normal.   Cervical length 1.3 cm with funnel today.    ASSESSMENT/COUNSELIN y.o.   at  20 6/7 weeks gestation (Estimated Date of Delivery: 24).     -Pregnancy  [ X ] stable  [   ] improving " [  ] worsening    Diagnoses and all orders for this visit:    1. Short cervix affecting pregnancy (Primary)  -     Case Request    Other orders  -     Admit To Obstetrics Inpatient; Standing  -     Code Status and Medical Interventions:; Standing  -     Place Sequential Compression Device; Standing  -     Maintain Sequential Compression Device; Standing  -     Vital Signs q 4 while awake; Standing  -     Antepartum Patients  <24 Weeks - Document Fetal Heart Tones Daily and PRN.; Standing  -     Notify Provider (Specified); Standing  -     Notify Provider of Tachysystole (Per Hospital Algorithm); Standing  -     Notify Provider if Membranes Ruptured, Bleeding Greater Than 1 Pad Per Hour, Fetal Heart Tone Abnormality or Severe Pain; Standing  -     Weigh Patient Daily; Standing  -     CBC (No Diff); Standing  -     T Pallidum Antibody w/ reflex RPR; Standing  -     Type & Screen; Standing  -     Insert Peripheral IV; Standing  -     Saline Lock & Maintain IV Access; Standing  -     sodium chloride 0.9 % flush 10 mL  -     sodium chloride 0.9 % flush 10 mL  -     sodium chloride 0.9 % infusion 40 mL  -     lidocaine (XYLOCAINE) 1 % injection 0.5 mL  -     acetaminophen (TYLENOL) tablet 650 mg  -     calcium carbonate (TUMS) chewable tablet 500 mg (200 mg elemental)  -     ondansetron ODT (ZOFRAN-ODT) disintegrating tablet 8 mg  -     ondansetron (ZOFRAN) injection 4 mg  -     docusate sodium (COLACE) capsule 100 mg  -     bisacodyl (DULCOLAX) suppository 10 mg  -     External Uterine Contraction Monitoring only; Standing  -     NPO Diet NPO Type: Strict NPO; Standing       Short cervix, 1.3 cm today in our office, from 1.8 earlier in week.      According to ACOG, incidentally detected short cervical length in the second trimester in the absence of a prior madsen  birth is not diagnostic of cervical insufficiency, and cerclage is not indicated in this setting. Vaginal progesterone is recommended as a  management option to reduce the risk of  birth in asymptomatic women with a madsen gestation without a prior  birth with an incidentally identified very short cervical length. HOWEVER, we discussed based on her cervix <1 cm at Dr. Villasenor's office there is some data to offering cerclage for this indication and I would offer given what I am seeing on US, understanding that she doesn't meet the strict criteria.  We had a long discussion about cerclage previously and again today.  We are seeing some progressive shortening and I am afraid if we don't do the cerclage we are just delaying the inevitable.  I can't guarrantee this and there is a chance she could stay pregnant many more weeks.  Since the cervix is shorter today, she would like to proceed with cerclage.   We did discuss the cerclage procedure and risks associated.  I discussed risks of a cerclage including risk of bleeding, infection, damage to bowel/bladder, PPROM, fetal demise.      We discussed modified bedrest, activity restriction, pelvic rest.  She is doing vaginal progesterone but thinks it may be causing her some congestion--told her this is unlikely, but if she feels strongly against the progesterone, she can stop it.  We will go ahead and stop for a few days anyways after the cerclage.      They asked very good questions and we had a long discussion.  They decided to be admitted and undergo cerclage in AM.  Will admit for 24 hours of IV antibiotics, indocin, and 2-3 hours of toco tonight.  Cerclage will be at 7AM.  Will need to be NPO after MN.  Pt amenable.        Summary of Plan  -Admit, 24 hours of ancef/indocin  -2-3 hours of toco then can D/c  -NPO after MN for cerclage at 7AM  -Home tomorrow with 48 hours of indocin and Zpack.       Follow-up: 1-2 weeks post op    Thank you for the consult and opportunity to care for this patient.  Please feel free to reach out with any questions or concerns.      I spent 35 minutes caring for  this patient on this date of service. This time includes time spent by me in the following activities: preparing for the visit, reviewing tests, obtaining and/or reviewing a separately obtained history, performing a medically appropriate examination and/or evaluation, counseling and educating the patient/family/caregiver and independently interpreting results and communicating that information with the patient/family/caregiver with greater than 50% spent in counseling and coordination of care.       I spent 4 minutes on the separately reported service of US imaging not included in the time used to support the E/M service also reported today.      Zuleyka Lin MD FACOG  Maternal Fetal Medicine-[facility]  Office: [phone]  [email or web address]

## 2024-01-18 NOTE — H&P
MATERNAL FETAL MEDICINE Consult Note    Dear Dr Jack Palma MD:    Thank you for your kind referral of Marysol HILLIARD.  As you know, she is a 33 y.o.   at  20 6/7 weeks gestation (Estimated Date of Delivery: 24). This is a consult.      Her antepartum course is complicated by:  Short cervix, hx of term delivery, progressively shortening    Aneuploidy Screening: none seen    HPI: Today, she denies headache, blurry vision, RUQ pain. No vaginal bleeding, no contractions.     Review of History:  Past Medical History:   Diagnosis Date    Placental abruption      Past Surgical History:   Procedure Laterality Date     SECTION N/A 10/20/2021    Procedure:  SECTION PRIMARY;  Surgeon: Meenu Villasenor MD;  Location: [facility] LABOR DELIVERY;  Service: Obstetrics/Gynecology;  Laterality: N/A;    WISDOM TOOTH EXTRACTION         Social History     Socioeconomic History    Marital status:    Tobacco Use    Smoking status: Never     Passive exposure: Never    Smokeless tobacco: Never   Vaping Use    Vaping Use: Never used   Substance and Sexual Activity    Alcohol use: Not Currently    Drug use: Never    Sexual activity: Yes     Partners: Male     Birth control/protection: None     Comment: no BC     Family History   Problem Relation Age of Onset    Breast cancer Neg Hx     Ovarian cancer Neg Hx     Uterine cancer Neg Hx     Colon cancer Neg Hx       Allergies   Allergen Reactions    Adhesive Tape Irritability      No current facility-administered medications on file prior to encounter.     Current Outpatient Medications on File Prior to Encounter   Medication Sig Dispense Refill    doxylamine (UNISOM) 25 MG tablet Take 1 tablet by mouth At Night As Needed for Sleep.      prenatal vitamin (prenatal, CLASSIC, vitamin) tablet Take  by mouth Daily.      Progesterone (PROMETRIUM) 200 MG capsule Insert 1 capsule into the vagina every night at bedtime. 90 capsule 5        Past obstetric,  gynecological, medical, surgical, family and social history reviewed.  Relevant lab work and imaging reviewed.    Review of systems  Constitutional:  denies fever, chills, malaise.   ENT/Mouth:  denies sore throat, tinnitus  Eyes: denies vision changes/pain  CV:  denies chest pain  Respiratory:  denies cough/SOB  GI:  denies N/V, diarrhea, abdominal pain.    :   denies dysuria  Skin:  denies lesions or pruritus   Neuro:  denies weakness, focal neurologic symptoms    Vitals:    24 1416   BP: 125/71   Pulse: 101       PHYSICAL EXAM   GENERAL: Not in acute distress, AAOx3, pleasant  CARDIO: regular rate and rhythm  PULM: symmetric chest rise, speaking in complete sentences without difficulty  NEURO: awake, alert and oriented to person, place, and time  ABDOMINAL: No fundal tenderness, no rebound or guarding, gravid  EXTREMITIES: no bilateral lower extremity edema/tenderness  SKIN: Warm, well-perfused      ULTRASOUND   Please view full ultrasound note on Imaging tab in ViewPoint.  Cephalic presentation.  Posterior placenta.  MVP 4.3 cm, which is normal.   Follow up anatomy appears normal.   Cervical length 1.3 cm with funnel today.    ASSESSMENT/COUNSELIN y.o.   at  20 6/7 weeks gestation (Estimated Date of Delivery: 24).     -Pregnancy  [ X ] stable  [   ] improving [  ] worsening    There are no diagnoses linked to this encounter.     Short cervix, 1.3 cm today in our office, from 1.8 earlier in week.      According to ACOG, incidentally detected short cervical length in the second trimester in the absence of a prior madsen  birth is not diagnostic of cervical insufficiency, and cerclage is not indicated in this setting. Vaginal progesterone is recommended as a management option to reduce the risk of  birth in asymptomatic women with a madsen gestation without a prior  birth with an incidentally identified very short cervical length. HOWEVER, we discussed based on  her cervix <1 cm at Dr. Villasenor's office there is some data to offering cerclage for this indication and I would offer given what I am seeing on US, understanding that she doesn't meet the strict criteria.  We had a long discussion about cerclage previously and again today.  We are seeing some progressive shortening and I am afraid if we don't do the cerclage we are just delaying the inevitable.  I can't guarrantee this and there is a chance she could stay pregnant many more weeks.  Since the cervix is shorter today, she would like to proceed with cerclage.   We did discuss the cerclage procedure and risks associated.  I discussed risks of a cerclage including risk of bleeding, infection, damage to bowel/bladder, PPROM, fetal demise.      We discussed modified bedrest, activity restriction, pelvic rest.  She is doing vaginal progesterone but thinks it may be causing her some congestion--told her this is unlikely, but if she feels strongly against the progesterone, she can stop it.  We will go ahead and stop for a few days anyways after the cerclage.      They asked very good questions and we had a long discussion.  They decided to be admitted and undergo cerclage in AM.  Will admit for 24 hours of IV antibiotics, indocin, and 2-3 hours of toco tonight.  Cerclage will be at 7AM.  Will need to be NPO after MN.  Pt amenable.        Summary of Plan  -Admit, 24 hours of ancef/indocin  -2-3 hours of toco then can D/c  -NPO after MN for cerclage at 7AM  -Home tomorrow with 48 hours of indocin and Zpack.       Follow-up: 1-2 weeks post op    Thank you for the consult and opportunity to care for this patient.  Please feel free to reach out with any questions or concerns.      I spent 35 minutes caring for this patient on this date of service. This time includes time spent by me in the following activities: preparing for the visit, reviewing tests, obtaining and/or reviewing a separately obtained history, performing a medically  appropriate examination and/or evaluation, counseling and educating the patient/family/caregiver and independently interpreting results and communicating that information with the patient/family/caregiver with greater than 50% spent in counseling and coordination of care.       I spent 4 minutes on the separately reported service of US imaging not included in the time used to support the E/M service also reported today.      Zuleyka Lin MD [facility]  Maternal Fetal Medicine-[facility]  Office: [phone]  [email or web address]

## 2024-01-18 NOTE — PROGRESS NOTES
Pt reports that she is doing well and denies vaginal bleeding, cramping, contractions or LOF at this time. Reports active fetal movement. Reviewed when to call OB office or present to L&D for evaluation with symptoms such as decreased fetal movement, vaginal bleeding, LOF or ctxs. Pt verbalized understanding. Denies visual changes or epigastric pain. Reports HA and head cold/drainage since starting progesterone. Denies any additional complaints at time of appointment. Next OB appointment scheduled for 2/9.    Vitals:    01/18/24 1148   BP: 107/67   Pulse: 107   Temp: 97.8 °F (36.6 °C)

## 2024-01-18 NOTE — PROGRESS NOTES
MATERNAL FETAL MEDICINE Consult Note    Dear Dr Meenu Villasenor MD:    Thank you for your kind referral of Marysol HILLIARD.  As you know, she is a 33 y.o.   at  20 6/7 weeks gestation (Estimated Date of Delivery: 24). This is a consult.      Her antepartum course is complicated by:  Short cervix, hx of term delivery, progressively shortening    Aneuploidy Screening: none seen    HPI: Today, she denies headache, blurry vision, RUQ pain. No vaginal bleeding, no contractions.     Review of History:  Past Medical History:   Diagnosis Date    Placental abruption      Past Surgical History:   Procedure Laterality Date     SECTION N/A 10/20/2021    Procedure:  SECTION PRIMARY;  Surgeon: Meenu Villasenor MD;  Location: [facility] LABOR DELIVERY;  Service: Obstetrics/Gynecology;  Laterality: N/A;    WISDOM TOOTH EXTRACTION         Social History     Socioeconomic History    Marital status:    Tobacco Use    Smoking status: Never     Passive exposure: Never    Smokeless tobacco: Never   Vaping Use    Vaping Use: Never used   Substance and Sexual Activity    Alcohol use: Not Currently    Drug use: Never    Sexual activity: Yes     Partners: Male     Birth control/protection: None     Comment: no BC     Family History   Problem Relation Age of Onset    Breast cancer Neg Hx     Ovarian cancer Neg Hx     Uterine cancer Neg Hx     Colon cancer Neg Hx       Allergies   Allergen Reactions    Adhesive Tape Irritability      No current facility-administered medications on file prior to visit.     Current Outpatient Medications on File Prior to Visit   Medication Sig Dispense Refill    doxylamine (UNISOM) 25 MG tablet Take 1 tablet by mouth At Night As Needed for Sleep.      prenatal vitamin (prenatal, CLASSIC, vitamin) tablet Take  by mouth Daily.      Progesterone (PROMETRIUM) 200 MG capsule Insert 1 capsule into the vagina every night at bedtime. 90 capsule 5        Past obstetric,  "gynecological, medical, surgical, family and social history reviewed.  Relevant lab work and imaging reviewed.    Review of systems  Constitutional:  denies fever, chills, malaise.   ENT/Mouth:  denies sore throat, tinnitus  Eyes: denies vision changes/pain  CV:  denies chest pain  Respiratory:  denies cough/SOB  GI:  denies N/V, diarrhea, abdominal pain.    :   denies dysuria  Skin:  denies lesions or pruritus   Neuro:  denies weakness, focal neurologic symptoms    Vitals:    24 1148   BP: 107/67   BP Location: Right arm   Patient Position: Sitting   Pulse: 107   Temp: 97.8 °F (36.6 °C)   TempSrc: Temporal   Weight: 63 kg (139 lb)   Height: 157.5 cm (62\")       PHYSICAL EXAM   GENERAL: Not in acute distress, AAOx3, pleasant  CARDIO: regular rate and rhythm  PULM: symmetric chest rise, speaking in complete sentences without difficulty  NEURO: awake, alert and oriented to person, place, and time  ABDOMINAL: No fundal tenderness, no rebound or guarding, gravid  EXTREMITIES: no bilateral lower extremity edema/tenderness  SKIN: Warm, well-perfused      ULTRASOUND   Please view full ultrasound note on Imaging tab in ViewPoint.  Cephalic presentation.  Posterior placenta.  MVP 4.3 cm, which is normal.   Follow up anatomy appears normal.   Cervical length 1.3 cm with funnel today.    ASSESSMENT/COUNSELIN y.o.   at  20 6/7 weeks gestation (Estimated Date of Delivery: 24).     -Pregnancy  [ X ] stable  [   ] improving [  ] worsening    Diagnoses and all orders for this visit:    1. Short cervix affecting pregnancy (Primary)  -     Case Request    Other orders  -     Admit To Obstetrics Inpatient; Standing  -     Code Status and Medical Interventions:; Standing  -     Place Sequential Compression Device; Standing  -     Maintain Sequential Compression Device; Standing  -     Vital Signs q 4 while awake; Standing  -     Antepartum Patients  <24 Weeks - Document Fetal Heart Tones Daily and PRN.; " Standing  -     Notify Provider (Specified); Standing  -     Notify Provider of Tachysystole (Per Hospital Algorithm); Standing  -     Notify Provider if Membranes Ruptured, Bleeding Greater Than 1 Pad Per Hour, Fetal Heart Tone Abnormality or Severe Pain; Standing  -     Weigh Patient Daily; Standing  -     CBC (No Diff); Standing  -     T Pallidum Antibody w/ reflex RPR; Standing  -     Type & Screen; Standing  -     Insert Peripheral IV; Standing  -     Saline Lock & Maintain IV Access; Standing  -     sodium chloride 0.9 % flush 10 mL  -     sodium chloride 0.9 % flush 10 mL  -     sodium chloride 0.9 % infusion 40 mL  -     lidocaine (XYLOCAINE) 1 % injection 0.5 mL  -     acetaminophen (TYLENOL) tablet 650 mg  -     calcium carbonate (TUMS) chewable tablet 500 mg (200 mg elemental)  -     ondansetron ODT (ZOFRAN-ODT) disintegrating tablet 8 mg  -     ondansetron (ZOFRAN) injection 4 mg  -     docusate sodium (COLACE) capsule 100 mg  -     bisacodyl (DULCOLAX) suppository 10 mg  -     External Uterine Contraction Monitoring only; Standing  -     NPO Diet NPO Type: Strict NPO; Standing       Short cervix, 1.3 cm today in our office, from 1.8 earlier in week.      According to ACOG, incidentally detected short cervical length in the second trimester in the absence of a prior madsen  birth is not diagnostic of cervical insufficiency, and cerclage is not indicated in this setting. Vaginal progesterone is recommended as a management option to reduce the risk of  birth in asymptomatic women with a madsen gestation without a prior  birth with an incidentally identified very short cervical length. HOWEVER, we discussed based on her cervix <1 cm at Dr. Villasenor's office there is some data to offering cerclage for this indication and I would offer given what I am seeing on US, understanding that she doesn't meet the strict criteria.  We had a long discussion about cerclage previously and again  today.  We are seeing some progressive shortening and I am afraid if we don't do the cerclage we are just delaying the inevitable.  I can't guarrantee this and there is a chance she could stay pregnant many more weeks.  Since the cervix is shorter today, she would like to proceed with cerclage.   We did discuss the cerclage procedure and risks associated.  I discussed risks of a cerclage including risk of bleeding, infection, damage to bowel/bladder, PPROM, fetal demise.      We discussed modified bedrest, activity restriction, pelvic rest.  She is doing vaginal progesterone but thinks it may be causing her some congestion--told her this is unlikely, but if she feels strongly against the progesterone, she can stop it.  We will go ahead and stop for a few days anyways after the cerclage.      They asked very good questions and we had a long discussion.  They decided to be admitted and undergo cerclage in AM.  Will admit for 24 hours of IV antibiotics, indocin, and 2-3 hours of toco tonight.  Cerclage will be at 7AM.  Will need to be NPO after MN.  Pt amenable.        Summary of Plan  -Admit, 24 hours of ancef/indocin  -2-3 hours of toco then can D/c  -NPO after MN for cerclage at 7AM  -Home tomorrow with 48 hours of indocin and Zpack.       Follow-up: 1-2 weeks post op    Thank you for the consult and opportunity to care for this patient.  Please feel free to reach out with any questions or concerns.      I spent 35 minutes caring for this patient on this date of service. This time includes time spent by me in the following activities: preparing for the visit, reviewing tests, obtaining and/or reviewing a separately obtained history, performing a medically appropriate examination and/or evaluation, counseling and educating the patient/family/caregiver and independently interpreting results and communicating that information with the patient/family/caregiver with greater than 50% spent in counseling and coordination  of care.       I spent 4 minutes on the separately reported service of US imaging not included in the time used to support the E/M service also reported today.      Zuleyka Lin MD [facility]  Maternal Fetal Medicine-[facility]  Office: [phone]  [email or web address]

## 2024-01-19 ENCOUNTER — ANESTHESIA (OUTPATIENT)
Dept: PERIOP | Facility: HOSPITAL | Age: 34
End: 2024-01-19
Payer: COMMERCIAL

## 2024-01-19 VITALS
BODY MASS INDEX: 26.13 KG/M2 | RESPIRATION RATE: 16 BRPM | SYSTOLIC BLOOD PRESSURE: 102 MMHG | DIASTOLIC BLOOD PRESSURE: 54 MMHG | HEART RATE: 82 BPM | WEIGHT: 142 LBS | OXYGEN SATURATION: 99 % | TEMPERATURE: 98.6 F | HEIGHT: 62 IN

## 2024-01-19 PROCEDURE — 25810000003 LACTATED RINGERS PER 1000 ML: Performed by: ANESTHESIOLOGY

## 2024-01-19 PROCEDURE — 59320 REVISION OF CERVIX: CPT | Performed by: OBSTETRICS & GYNECOLOGY

## 2024-01-19 PROCEDURE — L3999 UPPER LIMB ORTHOSIS NOS: HCPCS | Performed by: OBSTETRICS & GYNECOLOGY

## 2024-01-19 PROCEDURE — 25010000002 ONDANSETRON PER 1 MG: Performed by: ANESTHESIOLOGY

## 2024-01-19 PROCEDURE — 25010000002 BUPIVACAINE PF 0.75 % SOLUTION

## 2024-01-19 PROCEDURE — 25010000002 CEFAZOLIN IN DEXTROSE 2-4 GM/100ML-% SOLUTION: Performed by: OBSTETRICS & GYNECOLOGY

## 2024-01-19 RX ORDER — SODIUM CHLORIDE 0.9 % (FLUSH) 0.9 %
3 SYRINGE (ML) INJECTION EVERY 12 HOURS SCHEDULED
Status: DISCONTINUED | OUTPATIENT
Start: 2024-01-19 | End: 2024-01-19 | Stop reason: HOSPADM

## 2024-01-19 RX ORDER — LIDOCAINE HYDROCHLORIDE 10 MG/ML
0.5 INJECTION, SOLUTION INFILTRATION; PERINEURAL ONCE AS NEEDED
Status: DISCONTINUED | OUTPATIENT
Start: 2024-01-19 | End: 2024-01-19 | Stop reason: HOSPADM

## 2024-01-19 RX ORDER — AZITHROMYCIN 250 MG/1
TABLET, FILM COATED ORAL
Qty: 6 TABLET | Refills: 0 | Status: SHIPPED | OUTPATIENT
Start: 2024-01-19

## 2024-01-19 RX ORDER — BUPIVACAINE HYDROCHLORIDE 7.5 MG/ML
INJECTION, SOLUTION EPIDURAL; RETROBULBAR AS NEEDED
Status: DISCONTINUED | OUTPATIENT
Start: 2024-01-19 | End: 2024-01-19 | Stop reason: SURG

## 2024-01-19 RX ORDER — SODIUM CHLORIDE 0.9 % (FLUSH) 0.9 %
3-10 SYRINGE (ML) INJECTION AS NEEDED
Status: DISCONTINUED | OUTPATIENT
Start: 2024-01-19 | End: 2024-01-19 | Stop reason: HOSPADM

## 2024-01-19 RX ORDER — SODIUM CHLORIDE, SODIUM LACTATE, POTASSIUM CHLORIDE, CALCIUM CHLORIDE 600; 310; 30; 20 MG/100ML; MG/100ML; MG/100ML; MG/100ML
9 INJECTION, SOLUTION INTRAVENOUS CONTINUOUS
Status: DISCONTINUED | OUTPATIENT
Start: 2024-01-19 | End: 2024-01-19 | Stop reason: HOSPADM

## 2024-01-19 RX ORDER — PRENATAL VIT/IRON FUM/FOLIC AC 27MG-0.8MG
1 TABLET ORAL DAILY
Status: DISCONTINUED | OUTPATIENT
Start: 2024-01-19 | End: 2024-01-19 | Stop reason: HOSPADM

## 2024-01-19 RX ORDER — PROGESTERONE 200 MG/1
200 CAPSULE ORAL NIGHTLY
Status: DISCONTINUED | OUTPATIENT
Start: 2024-01-19 | End: 2024-01-19 | Stop reason: HOSPADM

## 2024-01-19 RX ORDER — ONDANSETRON 2 MG/ML
4 INJECTION INTRAMUSCULAR; INTRAVENOUS EVERY 6 HOURS PRN
Status: DISCONTINUED | OUTPATIENT
Start: 2024-01-19 | End: 2024-01-19 | Stop reason: HOSPADM

## 2024-01-19 RX ORDER — MAGNESIUM HYDROXIDE 1200 MG/15ML
LIQUID ORAL AS NEEDED
Status: DISCONTINUED | OUTPATIENT
Start: 2024-01-19 | End: 2024-01-19 | Stop reason: HOSPADM

## 2024-01-19 RX ORDER — INDOMETHACIN 25 MG/1
50 CAPSULE ORAL EVERY 8 HOURS
Qty: 12 CAPSULE | Refills: 0 | Status: SHIPPED | OUTPATIENT
Start: 2024-01-19 | End: 2024-01-21

## 2024-01-19 RX ORDER — FAMOTIDINE 10 MG/ML
20 INJECTION, SOLUTION INTRAVENOUS ONCE
Status: COMPLETED | OUTPATIENT
Start: 2024-01-19 | End: 2024-01-19

## 2024-01-19 RX ADMIN — Medication 1 TABLET: at 13:14

## 2024-01-19 RX ADMIN — SODIUM CHLORIDE, POTASSIUM CHLORIDE, SODIUM LACTATE AND CALCIUM CHLORIDE 9 ML/HR: 600; 310; 30; 20 INJECTION, SOLUTION INTRAVENOUS at 06:54

## 2024-01-19 RX ADMIN — INDOMETHACIN 50 MG: 50 CAPSULE ORAL at 07:00

## 2024-01-19 RX ADMIN — CEFAZOLIN SODIUM 2000 MG: 2 INJECTION, SOLUTION INTRAVENOUS at 15:03

## 2024-01-19 RX ADMIN — CEFAZOLIN SODIUM 2000 MG: 2 INJECTION, SOLUTION INTRAVENOUS at 07:01

## 2024-01-19 RX ADMIN — INDOMETHACIN 50 MG: 50 CAPSULE ORAL at 15:03

## 2024-01-19 RX ADMIN — FAMOTIDINE 20 MG: 10 INJECTION INTRAVENOUS at 06:50

## 2024-01-19 RX ADMIN — BUPIVACAINE HYDROCHLORIDE 1.8 ML: 7.5 INJECTION, SOLUTION EPIDURAL; RETROBULBAR at 07:11

## 2024-01-19 RX ADMIN — SODIUM CHLORIDE, POTASSIUM CHLORIDE, SODIUM LACTATE AND CALCIUM CHLORIDE: 600; 310; 30; 20 INJECTION, SOLUTION INTRAVENOUS at 07:56

## 2024-01-19 RX ADMIN — ONDANSETRON HYDROCHLORIDE 4 MG: 2 INJECTION, SOLUTION INTRAMUSCULAR; INTRAVENOUS at 08:10

## 2024-01-19 NOTE — OP NOTE
OPERATIVE NOTE     PRE-OP DIAGNOSIS: Progressively shortening cervix here for ultrasound indicated cerclage.       POST-OP DIAGNOSIS: same, s/p US indicated cerclage    PROCEDURE: HELEN CERVICAL CERCLAGE     Surgeon: ZEENAT WALLS MD     Assistant:Sonya Jamison RN     Anesthesia: SPINAL     EBL:  10 CC, MINIMAL     INDICATION: Patient is a 32 yo  @ 21 w0d with progressively shortening cervix with documented length <1 cm.       INFORMED CONSENT -   Risks, benefits, and alternatives to cervical cerclage discussed.   Risks of cerclage included but not limited to - pain, bleeding, damage to cervix, rupture of membranes, infection, need for removal of cerclage, failure of cerclage, damage to bowel/bladder. Benefits include advancing gestational age at time of delivery, or reduction in  birth with delivery at term. The alternative would be expectant management and likely previable/periviable delivery.       PROCEDURE:   Patient was taken to the OR after informed consent obtained and prepped and draped in the usual sterile fashion. Time out was performed and a spinal was placed without complication.  She  was placed in dorsal lithotomy position in candy cane stirrups.   Rouse catheter was placed in a sterile fashion to drain bladder, removed, and vagina was irrigated with sterile betadine. Weighted speculum and [place] retractors were placed and provided good visualization of the cervix.  The cervix appeared FT dilated and flush to the back wall of the vagina with very little length.   Sponge stick clamps were used to grasp the cervix at 12 o'clock and 6 o'clock to provide traction for the procedure.      A 5mm mersilene tape was placed in a circumferential, purse string fashion without complication with good visualization of the bladder, vaginal side walls, and the rectum.  A stitch was placed from 12 o'clock to 9 o'clock, from 9 o'clock to 7 o'clock, from 7 o'clock to 4 o'clock, from 4 o'clock to  2 o'clock, and from 2 o'clock to 12 o'clock.   Cervical cerclage was tied down at 12 o'clock position and suture was cut with an approximate 1cm tail.   Cervix was noted to be hemostatic without evidence of rupture of membrane.    Rectal exam was negative for suture.  Patient was cleaned and taken back to the recovery room in stable condition.       My assistant was responsible for retracting and providing good visualization of operative field throughout procedure.       Fetal heart tones were normal in recovery.  Fetus was active and cervix appeared long from above.   Membranes appeared intact on US.       Zuleyka Lin MD FACOG  [facility]  Office: [phone]  [email or web address]

## 2024-01-19 NOTE — PAYOR COMM NOTE
"Bijal MALHOTRA (33 y.o. Female)       Date of Birth   1990    Social Security Number       Address   [address] [place] [address]    Home Phone   [phone]    MRN   [id number]       [place]   Patient Refused    Marital Status                               Admission Date   24    Admission Type   Elective    Admitting Provider   Zuleyka Lin MD    Attending Provider   Meenu Villasenor MD    Department, Room/Bed   [facility] LABOR DELIVERY, L       Discharge Date       Discharge Disposition   Home or Self Care    Discharge Destination                                 Attending Provider: Meenu Villasenor MD    Allergies: Adhesive Tape, Latex    Isolation: None   Infection: None   Code Status: CPR    Ht: 157.5 cm (62\")   Wt: 64.4 kg (142 lb)    Admission Cmt: None   Principal Problem: Short cervix affecting pregnancy [O26.879]                   Active Insurance as of 2024       Primary Coverage       Payor Plan Insurance Group Employer/Plan Group    [employer]UE [employer]OSS Located within [facility] EMPLOYEE [id number]       Payor Plan Address Payor Plan Phone Number Payor Plan Fax Number Effective Dates    [address] [phone]  10/1/2016 - None Entered    [place] [address]         Subscriber Name Subscriber Birth Date Member ID       BIJAL MALHOTRA 1990 [id number]                     Emergency Contacts        (Rel.) Home Phone Work Phone Mobile Phone    AN MALHOTRA (Spouse) [phone] -- --              Insurance Information                  [facility] [employer] Phone: [phone]    Subscriber: Bijal Malhotra Subscriber#: [id number]    Group#: [id number] Precert#: --             History & Physical        Zuleyka Lin MD at 24 1431          MATERNAL FETAL MEDICINE Consult Note    Dear Dr Jack Palma MD:    Thank you for your kind referral of Bijal MALHOTRA.  As you know, she is a 33 y.o.   " at  20 6/7 weeks gestation (Estimated Date of Delivery: 24). This is a consult.      Her antepartum course is complicated by:  Short cervix, hx of term delivery, progressively shortening    Aneuploidy Screening: none seen    HPI: Today, she denies headache, blurry vision, RUQ pain. No vaginal bleeding, no contractions.     Review of History:  Past Medical History:   Diagnosis Date    Placental abruption      Past Surgical History:   Procedure Laterality Date     SECTION N/A 10/20/2021    Procedure:  SECTION PRIMARY;  Surgeon: Meenu Villasenor MD;  Location: [facility] LABOR DELIVERY;  Service: Obstetrics/Gynecology;  Laterality: N/A;    WISDOM TOOTH EXTRACTION         Social History     Socioeconomic History    Marital status:    Tobacco Use    Smoking status: Never     Passive exposure: Never    Smokeless tobacco: Never   Vaping Use    Vaping Use: Never used   Substance and Sexual Activity    Alcohol use: Not Currently    Drug use: Never    Sexual activity: Yes     Partners: Male     Birth control/protection: None     Comment: no BC     Family History   Problem Relation Age of Onset    Breast cancer Neg Hx     Ovarian cancer Neg Hx     Uterine cancer Neg Hx     Colon cancer Neg Hx       Allergies   Allergen Reactions    Adhesive Tape Irritability      No current facility-administered medications on file prior to encounter.     Current Outpatient Medications on File Prior to Encounter   Medication Sig Dispense Refill    doxylamine (UNISOM) 25 MG tablet Take 1 tablet by mouth At Night As Needed for Sleep.      prenatal vitamin (prenatal, CLASSIC, vitamin) tablet Take  by mouth Daily.      Progesterone (PROMETRIUM) 200 MG capsule Insert 1 capsule into the vagina every night at bedtime. 90 capsule 5        Past obstetric, gynecological, medical, surgical, family and social history reviewed.  Relevant lab work and imaging reviewed.    Review of systems  Constitutional:  denies fever,  chills, malaise.   ENT/Mouth:  denies sore throat, tinnitus  Eyes: denies vision changes/pain  CV:  denies chest pain  Respiratory:  denies cough/SOB  GI:  denies N/V, diarrhea, abdominal pain.    :   denies dysuria  Skin:  denies lesions or pruritus   Neuro:  denies weakness, focal neurologic symptoms    Vitals:    24 1416   BP: 125/71   Pulse: 101       PHYSICAL EXAM   GENERAL: Not in acute distress, AAOx3, pleasant  CARDIO: regular rate and rhythm  PULM: symmetric chest rise, speaking in complete sentences without difficulty  NEURO: awake, alert and oriented to person, place, and time  ABDOMINAL: No fundal tenderness, no rebound or guarding, gravid  EXTREMITIES: no bilateral lower extremity edema/tenderness  SKIN: Warm, well-perfused      ULTRASOUND   Please view full ultrasound note on Imaging tab in ViewPoint.  Cephalic presentation.  Posterior placenta.  MVP 4.3 cm, which is normal.   Follow up anatomy appears normal.   Cervical length 1.3 cm with funnel today.    ASSESSMENT/COUNSELIN y.o.   at  20 6/7 weeks gestation (Estimated Date of Delivery: 24).     -Pregnancy  [ X ] stable  [   ] improving [  ] worsening    There are no diagnoses linked to this encounter.     Short cervix, 1.3 cm today in our office, from 1.8 earlier in week.      According to ACOG, incidentally detected short cervical length in the second trimester in the absence of a prior madsen  birth is not diagnostic of cervical insufficiency, and cerclage is not indicated in this setting. Vaginal progesterone is recommended as a management option to reduce the risk of  birth in asymptomatic women with a madsen gestation without a prior  birth with an incidentally identified very short cervical length. HOWEVER, we discussed based on her cervix <1 cm at Dr. Villasenor's office there is some data to offering cerclage for this indication and I would offer given what I am seeing on US, understanding  that she doesn't meet the strict criteria.  We had a long discussion about cerclage previously and again today.  We are seeing some progressive shortening and I am afraid if we don't do the cerclage we are just delaying the inevitable.  I can't guarrantee this and there is a chance she could stay pregnant many more weeks.  Since the cervix is shorter today, she would like to proceed with cerclage.   We did discuss the cerclage procedure and risks associated.  I discussed risks of a cerclage including risk of bleeding, infection, damage to bowel/bladder, PPROM, fetal demise.      We discussed modified bedrest, activity restriction, pelvic rest.  She is doing vaginal progesterone but thinks it may be causing her some congestion--told her this is unlikely, but if she feels strongly against the progesterone, she can stop it.  We will go ahead and stop for a few days anyways after the cerclage.      They asked very good questions and we had a long discussion.  They decided to be admitted and undergo cerclage in AM.  Will admit for 24 hours of IV antibiotics, indocin, and 2-3 hours of toco tonight.  Cerclage will be at 7AM.  Will need to be NPO after MN.  Pt amenable.        Summary of Plan  -Admit, 24 hours of ancef/indocin  -2-3 hours of toco then can D/c  -NPO after MN for cerclage at 7AM  -Home tomorrow with 48 hours of indocin and Zpack.       Follow-up: 1-2 weeks post op    Thank you for the consult and opportunity to care for this patient.  Please feel free to reach out with any questions or concerns.      I spent 35 minutes caring for this patient on this date of service. This time includes time spent by me in the following activities: preparing for the visit, reviewing tests, obtaining and/or reviewing a separately obtained history, performing a medically appropriate examination and/or evaluation, counseling and educating the patient/family/caregiver and independently interpreting results and communicating that  information with the patient/family/caregiver with greater than 50% spent in counseling and coordination of care.       I spent 4 minutes on the separately reported service of US imaging not included in the time used to support the E/M service also reported today.      Zuleyka Lin MD FACOG  Maternal Fetal Medicine-[facility]  Office: [phone]  [email or web address]          Electronically signed by Zuleyka Lin MD at 01/18/24 1431       Facility-Administered Medications as of 1/19/2024   Medication Dose Route Frequency Provider Last Rate Last Admin    ceFAZolin in dextrose (ANCEF) IVPB solution 2,000 mg  2,000 mg Intravenous Q8H Zuleyka Lin MD   Stopped at 01/19/24 0730    doxylamine (UNISOM) tablet 25 mg  25 mg Oral Nightly PRN Zuleyka Lin MD        [COMPLETED] famotidine (PEPCID) injection 20 mg  20 mg Intravenous Once ProtzerGlenys MD   20 mg at 01/19/24 0650    indomethacin (INDOCIN) capsule 50 mg  50 mg Oral Q8H Zuleyka Lin MD   50 mg at 01/19/24 0700    lactated ringers infusion  9 mL/hr Intravenous Continuous Protzer, Glenys Hodges MD 9 mL/hr at 01/19/24 0704 New Bag at 01/19/24 0756    prenatal vitamin tablet 1 tablet  1 tablet Oral Daily Zuleyka Lin MD        Progesterone (PROMETRIUM) capsule 200 mg  200 mg Vaginal Nightly Zuleyka Lin MD        sodium chloride 0.9 % flush 10 mL  10 mL Intravenous Q12H Zuleyka Lin MD        sodium chloride 0.9 % flush 10 mL  10 mL Intravenous PRN Zuleyka Lin MD         Lab Results (last 72 hours)       Procedure Component Value Units Date/Time    Comprehensive Metabolic Panel [[id number]]  (Abnormal) Collected: 01/18/24 1433    Specimen: Blood from Arm, Left Updated: 01/18/24 1515     Glucose 97 mg/dL      BUN 7 mg/dL      Creatinine 0.60 mg/dL      Sodium 133 mmol/L      Potassium 4.3 mmol/L      Chloride 102 mmol/L      CO2 20.6 mmol/L      Calcium 8.7 mg/dL      Total Protein 6.2 g/dL      Albumin  3.5 g/dL      ALT (SGPT) 6 U/L      AST (SGOT) 11 U/L      Alkaline Phosphatase 61 U/L      Total Bilirubin <0.2 mg/dL      Globulin 2.7 gm/dL      A/G Ratio 1.3 g/dL      BUN/Creatinine Ratio 11.7     Anion Gap 10.4 mmol/L      eGFR 122.5 mL/min/1.73     Narrative:      GFR Normal >60  Chronic Kidney Disease <60  Kidney Failure <15      CBC & Differential [[id number]]  (Abnormal) Collected: 01/18/24 1433    Specimen: Blood from Arm, Left Updated: 01/18/24 1450    Narrative:      The following orders were created for panel order CBC & Differential.  Procedure                               Abnormality         Status                     ---------                               -----------         ------                     CBC Auto Differential[[id number]]        Abnormal            Final result                 Please view results for these tests on the individual orders.    CBC Auto Differential [[id number]]  (Abnormal) Collected: 01/18/24 1433    Specimen: Blood from Arm, Left Updated: 01/18/24 1450     WBC 7.80 10*3/mm3      RBC 3.85 10*6/mm3      Hemoglobin 12.1 g/dL      Hematocrit 35.8 %      MCV 93.0 fL      MCH 31.4 pg      MCHC 33.8 g/dL      RDW 13.4 %      RDW-SD 45.6 fl      MPV 9.7 fL      Platelets 143 10*3/mm3      Neutrophil % 80.1 %      Lymphocyte % 12.7 %      Monocyte % 6.5 %      Eosinophil % 0.3 %      Basophil % 0.1 %      Neutrophils, Absolute 6.25 10*3/mm3      Lymphocytes, Absolute 0.99 10*3/mm3      Monocytes, Absolute 0.51 10*3/mm3      Eosinophils, Absolute 0.02 10*3/mm3      Basophils, Absolute 0.01 10*3/mm3           Imaging Results (Last 72 Hours)       ** No results found for the last 72 hours. **          ECG/EMG Results (last 72 hours)       ** No results found for the last 72 hours. **          Orders (last 72 hrs)        Start     Ordered    01/19/24 2100  Progesterone (PROMETRIUM) capsule 200 mg  Nightly         01/19/24 0857    01/19/24 0900  sodium chloride 0.9 % flush 3 mL  Every  12 Hours Scheduled,   Status:  Discontinued         01/19/24 0628    01/19/24 0900  prenatal vitamin tablet 1 tablet  Daily         01/19/24 0857    01/19/24 0857  doxylamine (UNISOM) tablet 25 mg  Nightly PRN         01/19/24 0857    01/19/24 0850  Discharge patient  Once        Comments: Discharge after completed 24 hours of IV antibiotics, eating, voiding, and walking. Make sure has meds to beds before leaves.  Thank you!    01/19/24 0850    01/19/24 0849  Transfer Patient  Once         01/19/24 0849    01/19/24 0808  ondansetron (ZOFRAN) injection 4 mg  Every 6 Hours PRN,   Status:  Discontinued         01/19/24 0808    01/19/24 0800  Pulse Oximetry, Continuous  Continuous         01/19/24 0759 01/19/24 0800  Vital signs every 5 minutes for 15 minutes, every 15 minutes thereafter.  Once,   Status:  Canceled         01/19/24 0759    01/19/24 0800  Call Anesthesiologist for additional IV Fluid bolus for Hypotension/Tachycardia  Until Discontinued,   Status:  Canceled         01/19/24 0759    01/19/24 0800  Notify Anesthesia of Any Acute Changes in Patient Condition  Until Discontinued,   Status:  Canceled         01/19/24 0759    01/19/24 0800  Notify Anesthesia for Unrelieved Pain  Until Discontinued,   Status:  Canceled         01/19/24 0759    01/19/24 0800  Once DC criteria to floor met, follow surgeon's orders.  Until Discontinued,   Status:  Canceled         01/19/24 0759    01/19/24 0800  Discharge patient from PACU when discharge criteria is met.  Until Discontinued,   Status:  Canceled         01/19/24 0759 01/19/24 0630  lactated ringers infusion  Continuous         01/19/24 0628    01/19/24 0630  famotidine (PEPCID) injection 20 mg  Once         01/19/24 0628    01/19/24 0628  POC Urine Pregnancy  STAT,   Status:  Canceled         01/19/24 0628    01/19/24 0628  Insert Peripheral IV  Once,   Status:  Canceled         01/19/24 0628    01/19/24 0628  Saline Lock & Maintain IV Access  Continuous          01/19/24 0628 01/19/24 0628  Pulse Oximetry, Continuous  Continuous,   Status:  Canceled         01/19/24 0628 01/19/24 0628  May take Beta Blocker from home with sip of water.  Once,   Status:  Canceled        Comments: Only applicable to patients on home Beta Blocker    01/19/24 0628 01/19/24 0628  sterile water irrigation solution  As Needed,   Status:  Discontinued         01/19/24 0628 01/19/24 0627  Vital Signs - Per Anesthesia Protocol  As Needed,   Status:  Canceled       01/19/24 0628 01/19/24 0627  Oxygen Therapy- Nasal Cannula; Titrate 1-6 LPM Per SpO2; 90 - 95%  Continuous PRN,   Status:  Canceled       01/19/24 0628 01/19/24 0627  Pulse Oximetry, Continuous  Continuous PRN,   Status:  Canceled       01/19/24 0628 01/19/24 0627  POC Glucose PRN  As Needed,   Status:  Canceled      Comments: For all diabetic patients. Notify Anesthesiologist for blood sugar > 180.      01/19/24 0628 01/19/24 0627  sodium chloride 0.9 % flush 3-10 mL  As Needed,   Status:  Discontinued         01/19/24 0628 01/19/24 0627  lidocaine (XYLOCAINE) 1 % injection 0.5 mL  Once As Needed,   Status:  Discontinued         01/19/24 0628 01/19/24 0451  Obtain Informed Consent  Once,   Status:  Canceled         01/19/24 0451 01/19/24 0001  NPO Diet NPO Type: Strict NPO  Diet Effective Midnight,   Status:  Canceled         01/18/24 1441 01/18/24 2100  sodium chloride 0.9 % flush 10 mL  Every 12 Hours Scheduled         01/18/24 1405    01/18/24 2100  sodium chloride 0.9 % flush 10 mL  Every 12 Hours Scheduled,   Status:  Discontinued         01/18/24 1441 01/18/24 1600  Vital Signs q 4 while awake  Every 4 Hours,   Status:  Canceled      Comments: While the patient is awake.    01/18/24 1441    01/18/24 1538  Diet: Regular/House Diet; Texture: Regular Texture (IDDSI 7); Fluid Consistency: Thin (IDDSI 0)  Diet Effective Now,   Status:  Canceled         01/18/24 1537    01/18/24 1528  DIET MESSAGE  Grilled cheese on white, fruit plate, fries, chocolate cake and sprite  Once        Comments: Grilled cheese on white, fruit plate, fries, chocolate cake and sprite    01/18/24 1529    01/18/24 1515  ceFAZolin in Sodium Chloride (ANCEF) IVPB solution 3,000 mg  Every 8 Hours,   Status:  Discontinued         01/18/24 1418    01/18/24 1515  indomethacin (INDOCIN) capsule 50 mg  Every 8 Hours         01/18/24 1418    01/18/24 1515  ceFAZolin in dextrose (ANCEF) IVPB solution 2,000 mg  Every 8 Hours         01/18/24 1419    01/18/24 1445  Manual Differential  Once,   Status:  Canceled         01/18/24 1444    01/18/24 1442  Admit To Obstetrics Inpatient  Once         01/18/24 1441    01/18/24 1442  Code Status and Medical Interventions:  Continuous         01/18/24 1441    01/18/24 1442  Place Sequential Compression Device  Once         01/18/24 1441    01/18/24 1442  Maintain Sequential Compression Device  Continuous         01/18/24 1441    01/18/24 1442  Antepartum Patients  <24 Weeks - Document Fetal Heart Tones Daily and PRN.  Per Order Details        Comments: For Antepartum Patients Less Than 24 Weeks - Document Fetal Heart Tones Daily & PRN.    01/18/24 1441    01/18/24 1442  Notify Provider (Specified)  Until Discontinued         01/18/24 1441    01/18/24 1442  Notify Provider of Tachysystole (Per Hospital Algorithm)  Until Discontinued         01/18/24 1441    01/18/24 1442  Notify Provider if Membranes Ruptured, Bleeding Greater Than 1 Pad Per Hour, Fetal Heart Tone Abnormality or Severe Pain  Until Discontinued         01/18/24 1441    01/18/24 1442  Weigh Patient Daily  Daily       01/18/24 1441    01/18/24 1442  CBC (No Diff)  Once,   Status:  Canceled         01/18/24 1441    01/18/24 1442  T Pallidum Antibody w/ reflex RPR  Once,   Status:  Canceled         01/18/24 1441    01/18/24 1442  Insert Peripheral IV  Once         01/18/24 1441    01/18/24 1442  Saline Lock & Maintain IV Access  Continuous,    Status:  Canceled         01/18/24 1441    01/18/24 1442  External Uterine Contraction Monitoring only  Per Hospital Policy        Comments: For 2-3 hours when first admitted    01/18/24 1441    01/18/24 1441  sodium chloride 0.9 % flush 10 mL  As Needed,   Status:  Discontinued         01/18/24 1441    01/18/24 1441  sodium chloride 0.9 % infusion 40 mL  As Needed,   Status:  Discontinued         01/18/24 1441    01/18/24 1441  lidocaine (XYLOCAINE) 1 % injection 0.5 mL  Once As Needed,   Status:  Discontinued         01/18/24 1441    01/18/24 1441  acetaminophen (TYLENOL) tablet 650 mg  Every 4 Hours PRN,   Status:  Discontinued         01/18/24 1441    01/18/24 1441  calcium carbonate (TUMS) chewable tablet 500 mg (200 mg elemental)  Daily PRN,   Status:  Discontinued         01/18/24 1441    01/18/24 1441  ondansetron ODT (ZOFRAN-ODT) disintegrating tablet 8 mg  Every 8 Hours PRN,   Status:  Discontinued        See Hyperspace for full Linked Orders Report.    01/18/24 1441    01/18/24 1441  ondansetron (ZOFRAN) injection 4 mg  Every 8 Hours PRN,   Status:  Discontinued        See Hyperspace for full Linked Orders Report.    01/18/24 1441    01/18/24 1441  docusate sodium (COLACE) capsule 100 mg  2 Times Daily PRN,   Status:  Discontinued         01/18/24 1441    01/18/24 1441  bisacodyl (DULCOLAX) suppository 10 mg  Daily PRN,   Status:  Discontinued         01/18/24 1441    01/18/24 1405  Insert Peripheral IV  Once         01/18/24 1405    01/18/24 1405  PERIPHERAL IV CARE - Connectors / Hubs Must Be Scrubbed 15 Seconds Using 70% Alcohol & Allowed to Dry Before Accessing Line  Continuous         01/18/24 1405    01/18/24 1405  McNab monitoring for 2-3 hours, may d/c if no contractions  Physician Communication Order  Once        Comments: McNab monitoring for 2-3 hours, may d/c if no contractions    01/18/24 1405    01/18/24 1404  sodium chloride 0.9 % flush 10 mL  As Needed         01/18/24 1405    01/18/24 1404   CBC & Differential  Once         24 1405    24 1404  Comprehensive Metabolic Panel  Once         24 1405    24 1404  Type & Screen  Once         24 1405    24 1404  Vital Signs  Per Hospital Policy         24 1405    24 1404  CBC Auto Differential  PROCEDURE ONCE         24 1405    24 1403  Admit To Obstetrics Inpatient  Once         24 1405    Unscheduled  Change Peripheral IV Site  As Needed      Comments: Frequency Per Facility Policy    24 1405    Unscheduled  PERIPHERAL IV CARE - Change Dressing As Needed When Damp, Loose or Soiled  As Needed       24 1405    Unscheduled  Oxygen Therapy- Nasal Cannula; Titrate 1-6 LPM Per SpO2; 90 - 95%  Continuous PRN       24 0759                     Operative/Procedure Notes (last 72 hours)        Zeenat Lin MD at 24 0728               OPERATIVE NOTE     PRE-OP DIAGNOSIS: Progressively shortening cervix here for ultrasound indicated cerclage.       POST-OP DIAGNOSIS: same, s/p US indicated cerclage    PROCEDURE: HELEN CERVICAL CERCLAGE     Surgeon: ZEENAT LIN MD     Assistant:Sonya Jamison RN     Anesthesia: SPINAL     EBL:  10 CC, MINIMAL     INDICATION: Patient is a 34 yo  @ 21 w0d with progressively shortening cervix with documented length <1 cm.       INFORMED CONSENT -   Risks, benefits, and alternatives to cervical cerclage discussed.   Risks of cerclage included but not limited to - pain, bleeding, damage to cervix, rupture of membranes, infection, need for removal of cerclage, failure of cerclage, damage to bowel/bladder. Benefits include advancing gestational age at time of delivery, or reduction in  birth with delivery at term. The alternative would be expectant management and likely previable/periviable delivery.       PROCEDURE:   Patient was taken to the OR after informed consent obtained and prepped and draped in the usual sterile fashion. Time  out was performed and a spinal was placed without complication.  She  was placed in dorsal lithotomy position in candy cane stirrups.   Rouse catheter was placed in a sterile fashion to drain bladder, removed, and vagina was irrigated with sterile betadine. Weighted speculum and Domonique retractors were placed and provided good visualization of the cervix.  The cervix appeared FT dilated and flush to the back wall of the vagina with very little length.   Sponge stick clamps were used to grasp the cervix at 12 o'clock and 6 o'clock to provide traction for the procedure.      A 5mm mersilene tape was placed in a circumferential, purse string fashion without complication with good visualization of the bladder, vaginal side walls, and the rectum.  A stitch was placed from 12 o'clock to 9 o'clock, from 9 o'clock to 7 o'clock, from 7 o'clock to 4 o'clock, from 4 o'clock to 2 o'clock, and from 2 o'clock to 12 o'clock.   Cervical cerclage was tied down at 12 o'clock position and suture was cut with an approximate 1cm tail.   Cervix was noted to be hemostatic without evidence of rupture of membrane.    Rectal exam was negative for suture.  Patient was cleaned and taken back to the recovery room in stable condition.       My assistant was responsible for retracting and providing good visualization of operative field throughout procedure.       Fetal heart tones were normal in recovery.  Fetus was active and cervix appeared long from above.   Membranes appeared intact on US.       Zuleyka Lin MD FACOG  Maternal Fetal Medicine-[facility]  Office: [phone]  [email or web address]        Electronically signed by Zuleyka Lin MD at 01/19/24 1116       Physician Progress Notes (last 72 hours)  Notes from 01/16/24 1126 through 01/19/24 1126   No notes of this type exist for this encounter.       Consult Notes (last 72 hours)  Notes from 01/16/24 1126 through 01/19/24 1126   No notes of this type exist for  this encounter.

## 2024-01-19 NOTE — PLAN OF CARE
Problem: Adult Inpatient Plan of Care  Goal: Plan of Care Review  Outcome: Met  Flowsheets (Taken 2024 1514)  Progress: improving  Outcome Evaluation: Patient in LDR6 is recovering from a cerclage placement this morning. Patient denies pain at this time, VSS. Patient has ambulated with assistance, voiedd, and tolerated a regular diet. 2g ancef infusing, indocin given. Per Dr. Lin, patient stable for discharge once antibiotic infusion is complete.  Goal: Patient-Specific Goal (Individualized)  Outcome: Met  Goal: Absence of Hospital-Acquired Illness or Injury  Outcome: Met  Intervention: Identify and Manage Fall Risk  Recent Flowsheet Documentation  Taken 2024 1500 by Meenu Conn RN  Safety Promotion/Fall Prevention: safety round/check completed  Taken 2024 1130 by Meenu Conn RN  Safety Promotion/Fall Prevention: safety round/check completed  Intervention: Prevent and Manage VTE (Venous Thromboembolism) Risk  Recent Flowsheet Documentation  Taken 2024 1130 by Meenu Conn RN  VTE Prevention/Management:   bilateral   sequential compression devices on  Goal: Optimal Comfort and Wellbeing  Outcome: Met  Goal: Readiness for Transition of Care  Outcome: Met  Intervention: Mutually Develop Transition Plan  Recent Flowsheet Documentation  Taken 2024 1200 by Meenu Conn RN  Equipment Needed After Discharge: none  Equipment Currently Used at Home: none  Anticipated Changes Related to Illness: none  Transportation Anticipated: family or friend will provide  Transportation Concerns: none  Concerns to be Addressed: no discharge needs identified  Readmission Within the Last 30 Days: no previous admission in last 30 days  Patient/Family Anticipated Services at Transition: none  Patient/Family Anticipates Transition to: home with family     Problem:  Fall Injury Risk  Goal: Absence of Fall, Infant Drop and Related Injury  Outcome: Met  Intervention: Promote  Injury-Free Environment  Recent Flowsheet Documentation  Taken 1/19/2024 1500 by Meenu Conn, RN  Safety Promotion/Fall Prevention: safety round/check completed  Taken 1/19/2024 1130 by Meenu Conn, RN  Safety Promotion/Fall Prevention: safety round/check completed     Problem: Pain Acute  Goal: Acceptable Pain Control and Functional Ability  Outcome: Met   Goal Outcome Evaluation:           Progress: improving  Outcome Evaluation: Patient in LDR6 is recovering from a cerclage placement this morning. Patient denies pain at this time, VSS. Patient has ambulated with assistance, voiedd, and tolerated a regular diet. 2g ancef infusing, indocin given. Per Dr. Lin, patient stable for discharge once antibiotic infusion is complete.

## 2024-01-19 NOTE — NURSING NOTE
Pt. Leaves unit on stretcher in stable condition. susie Locke is given pt. Chart per RN. No sings of distress noted.

## 2024-01-19 NOTE — ANESTHESIA PROCEDURE NOTES
Intrathecal Block    Pre-sedation assessment completed: 1/19/2024 7:12 AM    Patient reassessed immediately prior to procedure    Stop Time: 1/19/2024 7:12 AM  Indication:at surgeon's request  Performed By  Anesthesiologist: Glenys Renee MD  Preanesthetic Checklist  Completed: patient identified, site marked, surgical consent, pre-op evaluation, timeout performed, IV checked, risks and benefits discussed and monitors and equipment checked  Intrathecal Block Prep:  Pt Position:sitting  Sterile Tech:sterile barrier, gloves and cap  Prep:chloraprep  Monitoring:blood pressure monitoring, continuous pulse oximetry and EKG  Intrathecal Block Procedure:  Approach:midline  Guidance:landmark technique  Location:L4-L5  Needle Type:Johnathan  Needle Gauge:25 G  Placement of Needle Event: cerebrospinal fluid  Fluid Appearance:clear  Post Assessment:  Patient Tolerance:patient tolerated the procedure well with no apparent complications  Complications:no

## 2024-01-19 NOTE — ANESTHESIA POSTPROCEDURE EVALUATION
"Patient: Marysol HILLIARD    Procedure Summary       Date: 01/19/24 Room / Location: [facility] OR  / [facility] MAIN OR    Anesthesia Start: 0704 Anesthesia Stop:     Procedure: CERVICAL CERCLAGE (Cervix) Diagnosis:       Short cervix affecting pregnancy      (Short cervix affecting pregnancy [O26.879])    Surgeons: Zuleyka Lin MD Provider: Glenys Renee MD    Anesthesia Type: spinal ASA Status: 2            Anesthesia Type: spinal    Vitals  Vitals Value Taken Time   /65 01/19/24 0915   Temp 36.3 °C (97.4 °F) 01/19/24 0801   Pulse 69 01/19/24 0916   Resp 14 01/19/24 0900   SpO2 100 % 01/19/24 0916   Vitals shown include unfiled device data.        Post Anesthesia Care and Evaluation    Patient location during evaluation: bedside  Patient participation: complete - patient participated  Level of consciousness: awake  Pain management: adequate    Airway patency: patent  Anesthetic complications: No anesthetic complications  PONV Status: controlled  Cardiovascular status: acceptable  Respiratory status: acceptable  Hydration status: acceptable    Comments: /64   Pulse 58   Temp 36.3 °C (97.4 °F) (Oral)   Resp 14   Ht 157.5 cm (62\")   Wt 64.4 kg (142 lb)   LMP 08/25/2023   SpO2 100%   BMI 25.97 kg/m²       "

## 2024-01-19 NOTE — NURSING NOTE
Report given to pre-op nurse. Surgical consent order placed. RN will listen to heart tones before pt. Leaves labor and delivery floor.

## 2024-01-19 NOTE — DISCHARGE SUMMARY
Admission date: 2024  Discharge date: 24    Referring Provider: Jack Palam MD    Admission diagnosis:  Pregnancy [Z34.90]  Short cervix affecting pregnancy [O26.879]    Patient Active Problem List   Diagnosis    Previous  section    History of placenta abruption    Pregnancy    Short cervix affecting pregnancy       Discharge diagnosis:  Short cervix, s/p ultrasound indicated cerclage.      Consultants:  anesthesia    Hospital course:  Pt is a 32 yo  @ 21 w 0 days who presented with progressively shortening cervix yesterday and was admitted for 24 hours of IV antibiotics and indocin.  She underwent uncomplicated Thakkar Cerclage this AM.  She had an uncomplicated post operative course and was sent home on modified activity to complete 48 hours of indocin and a 5 day z-pack.        Vitals:    24 0845   BP: 105/63   Pulse: 59   Resp: 14   Temp:    SpO2: 100%       GENERAL:  Well-developed, well-nourished in no acute distress.   ABD:  Gravid  FHT: 130's    Discharge condition: stable  Discharge diet   Dietary Orders (From admission, onward)       Start     Ordered    24 0001  NPO Diet NPO Type: Strict NPO  Diet Effective Midnight        Question:  NPO Type  Answer:  Strict NPO    24 1441    24 1528  DIET MESSAGE Grilled cheese on white, fruit plate, fries, chocolate cake and sprite  Once        Comments: Grilled cheese on white, fruit plate, fries, chocolate cake and sprite    24 1529                  Additional Instructions: Call with fevers, uncontrolled nausea/vomiting/pain.  Medications:      Discharge Medications        ASK your doctor about these medications        Instructions Start Date   azithromycin 250 MG tablet  Commonly known as: Zithromax Z-Agustin   Take 2 tablets by mouth on day 1, then 1 tablet daily on days 2-5      doxylamine 25 MG tablet  Commonly known as: UNISOM   25 mg, Oral, Nightly PRN      indomethacin 50 MG capsule  Commonly known as:  INDOCIN   50 mg, Oral, Every 8 Hours      prenatal (CLASSIC) vitamin 28-0.8 MG tablet tablet  Generic drug: prenatal vitamin   Oral, Daily      Progesterone 200 MG capsule  Commonly known as: PROMETRIUM   200 mg, Vaginal, Every Night at Bedtime             Disposition:Home or Self Care  Follow up:   Future Appointments   Date Time Provider Department Center   1/31/2024  8:30 AM DIMITRY [facility] US 1 BH DIMITRY US RI DIMITRY   1/31/2024  8:30 AM Zuleyka Lin MD [facility]FM DIMITRY DIMITRY   2/9/2024  8:30 AM US DIMITRY PI[facility] 2 [facility] OB  DIMITRY   2/9/2024  9:15 AM Meenu Villasenor MD [facility] OB  DIMITRY   3/25/2024  9:45 AM Meenu Villasenor MD [facility] OB  DIMITRY   4/8/2024  8:30 AM Meenu Villasenor MD [facility] OB  DIMITRY   4/22/2024  8:45 AM Meenu Villasenor MD [facility] OB  DIMITRY   4/29/2024  8:45 AM Meenu Villasenor MD [facility] OB  DIMITRY   5/9/2024  8:45 AM Meenu Villasenor MD [facility] OB  DIMITRY   5/13/2024  8:45 AM Meenu Villasenor MD [facility] OB  DIMITRY   5/20/2024  9:45 AM Meenu Villasenor MD [facility] OB  DIMITRY       Over 15 minutes on discharge.  Counseling and coordinating care.    Zuleyka Lin MD

## 2024-01-19 NOTE — ANESTHESIA PREPROCEDURE EVALUATION
" Anesthesia Evaluation     Patient summary reviewed and Nursing notes reviewed   history of anesthetic complications:  PONV  NPO Solid Status: > 8 hours  NPO Liquid Status: > 2 hours           Airway   Mallampati: II  Dental      Pulmonary - negative pulmonary ROS   Cardiovascular - negative cardio ROS  Exercise tolerance: good (4-7 METS)        Neuro/Psych- negative ROS  GI/Hepatic/Renal/Endo - negative ROS     Musculoskeletal (-) negative ROS    Abdominal    Substance History - negative use     OB/GYN    (+) Pregnant        Other                    Anesthesia Plan    ASA 2     spinal     (BP 93/59 (BP Location: Left arm, Patient Position: Lying)   Pulse 65   Temp 36.4 °C (97.6 °F) (Oral)   Resp 16   Ht 157.5 cm (62\")   Wt 64.4 kg (142 lb)   LMP 08/25/2023   SpO2 98%   BMI 25.97 kg/m²     I have reviewed the patient's history with the patient and the chart, including all pertinent laboratory results and imaging. I have explained the risks of anesthesia including but not limited to dental damage, corneal abrasion, nerve injury, MI, stroke, and death.    )    Anesthetic plan, risks, benefits, and alternatives have been provided, discussed and informed consent has been obtained with: patient.    CODE STATUS:    Level Of Support Discussed With: Patient  Code Status (Patient has no pulse and is not breathing): CPR (Attempt to Resuscitate)  Medical Interventions (Patient has pulse or is breathing): Full Support      "

## 2024-01-23 PROBLEM — O34.30 CERVICAL CERCLAGE SUTURE PRESENT: Status: ACTIVE | Noted: 2024-01-23

## 2024-01-31 ENCOUNTER — HOSPITAL ENCOUNTER (OUTPATIENT)
Dept: ULTRASOUND IMAGING | Facility: HOSPITAL | Age: 34
Discharge: HOME | End: 2024-01-31
Admitting: OBSTETRICS & GYNECOLOGY
Payer: COMMERCIAL

## 2024-01-31 ENCOUNTER — OFFICE VISIT (OUTPATIENT)
Dept: OBSTETRICS AND GYNECOLOGY | Facility: CLINIC | Age: 34
End: 2024-01-31
Payer: COMMERCIAL

## 2024-01-31 VITALS
TEMPERATURE: 97.8 F | WEIGHT: 146.8 LBS | SYSTOLIC BLOOD PRESSURE: 107 MMHG | DIASTOLIC BLOOD PRESSURE: 58 MMHG | BODY MASS INDEX: 27.02 KG/M2 | HEIGHT: 62 IN | HEART RATE: 73 BPM

## 2024-01-31 DIAGNOSIS — N88.3 SHORT CERVIX: ICD-10-CM

## 2024-01-31 DIAGNOSIS — O26.879 SHORT CERVIX AFFECTING PREGNANCY: Primary | ICD-10-CM

## 2024-01-31 PROCEDURE — 76815 OB US LIMITED FETUS(S): CPT

## 2024-01-31 PROCEDURE — 76817 TRANSVAGINAL US OBSTETRIC: CPT

## 2024-01-31 NOTE — LETTER
2024     Meenu Villasenor MD  [address]  [address]  [place] [address]    Patient: Marysol HILLIARD   YOB: 1990   Date of Visit: 2024       Dear Meenu Villasenor MD,    Thank you for referring Marysol HILLIARD to me for evaluation. Below is a copy of my consult note.    If you have questions, please do not hesitate to call me. I look forward to following Marysol along with you.         Sincerely,        CHLOE Middleton        CC: No Recipients                          MATERNAL FETAL MEDICINE Consult Note    Dear Dr Meenu Villasenor MD:    Thank you for your kind referral of Marysol HILLIARD.  As you know, she is a 33 y.o.   at  22 5/7 weeks gestation (Estimated Date of Delivery: 24). This is a consult.      Her antepartum course is complicated by:  Short cervix, hx of term delivery    Aneuploidy Screening: none seen    HPI: Today, she denies headache, blurry vision, RUQ pain. No vaginal bleeding, no contractions.     Review of History:  Past Medical History:   Diagnosis Date   • Placental abruption    • PONV (postoperative nausea and vomiting)      Past Surgical History:   Procedure Laterality Date   • CERVICAL CERCLAGE N/A 2024    Procedure: CERVICAL CERCLAGE;  Surgeon: Zuleyka Lin MD;  Location: [facility] OR;  Service: Obstetrics;  Laterality: N/A;   •  SECTION N/A 10/20/2021    Procedure:  SECTION PRIMARY;  Surgeon: Meenu Villasenor MD;  Location: [facility] LABOR DELIVERY;  Service: Obstetrics/Gynecology;  Laterality: N/A;   • WISDOM TOOTH EXTRACTION         Social History     Socioeconomic History   • Marital status:    Tobacco Use   • Smoking status: Never     Passive exposure: Never   • Smokeless tobacco: Never   Vaping Use   • Vaping Use: Never used   Substance and Sexual Activity   • Alcohol use: Not Currently   • Drug use: Never   • Sexual activity: Yes     Partners: Male     Birth control/protection: None      "Comment: no BC     Family History   Problem Relation Age of Onset   • Breast cancer Neg Hx    • Ovarian cancer Neg Hx    • Uterine cancer Neg Hx    • Colon cancer Neg Hx       Allergies   Allergen Reactions   • Adhesive Tape Irritability   • Latex Swelling      Current Outpatient Medications on File Prior to Visit   Medication Sig Dispense Refill   • doxylamine (UNISOM) 25 MG tablet Take 1 tablet by mouth At Night As Needed for Sleep.     • prenatal vitamin (prenatal, CLASSIC, vitamin) tablet Take  by mouth Daily.     • Progesterone (PROMETRIUM) 200 MG capsule Insert 1 capsule into the vagina every night at bedtime. 90 capsule 5   • azithromycin (Zithromax Z-Agustin) 250 MG tablet Take 2 tablets by mouth on day 1, then 1 tablet daily on days 2-5 (Patient not taking: Reported on 1/31/2024) 6 tablet 0     No current facility-administered medications on file prior to visit.        Past obstetric, gynecological, medical, surgical, family and social history reviewed.  Relevant lab work and imaging reviewed.    Review of systems  Constitutional:  denies fever, chills, malaise.   ENT/Mouth:  denies sore throat, tinnitus  Eyes: denies vision changes/pain  CV:  denies chest pain  Respiratory:  denies cough/SOB  GI:  denies N/V, diarrhea, abdominal pain.    :   denies dysuria  Skin:  denies lesions or pruritus   Neuro:  denies weakness, focal neurologic symptoms    Vitals:    01/31/24 0846   BP: 107/58   BP Location: Right arm   Patient Position: Sitting   Pulse: 73   Temp: 97.8 °F (36.6 °C)   TempSrc: Temporal   Weight: 66.6 kg (146 lb 12.8 oz)   Height: 157.5 cm (62\")     PHYSICAL EXAM   GENERAL: Not in acute distress, AAOx3, pleasant  CARDIO: regular rate and rhythm  PULM: symmetric chest rise, speaking in complete sentences without difficulty  NEURO: awake, alert and oriented to person, place, and time  ABDOMINAL: No fundal tenderness, no rebound or guarding, gravid  EXTREMITIES: no bilateral lower extremity " edema/tenderness  SKIN: Warm, well-perfused      ULTRASOUND   Please view full ultrasound note on Imaging tab in ViewPoint.  Breech presentation.  Posterior placenta.   Follow up anatomy appears normal  Transvaginal cervical length is 3.12 cm  1.8 cm from cerclage to external os    ASSESSMENT/COUNSELIN y.o.   at  22 5/7 weeks gestation (Estimated Date of Delivery: 24).     -Pregnancy  [ X ] stable  [   ] improving [  ] worsening    Diagnoses and all orders for this visit:    1. Short cervix affecting pregnancy (Primary)       Short cervix, 1.3 cm today in our office, from 1.8 earlier in week.      According to ACOG, incidentally detected short cervical length in the second trimester in the absence of a prior madsen  birth is not diagnostic of cervical insufficiency, and cerclage is not indicated in this setting. Vaginal progesterone is recommended as a management option to reduce the risk of  birth in asymptomatic women with a madsen gestation without a prior  birth with an incidentally identified very short cervical length. HOWEVER, we discussed based on her cervix <1 cm at Dr. Villasenor's office there is some data to offering cerclage for this indication and I would offer given what I am seeing on US, understanding that she doesn't meet the strict criteria.  Pt had cerclage placed on 2024 and did well. Pt denies any complaints today (no bleeding, cramping, etc).     We discussed modified bedrest, activity restriction, pelvic rest.  Pt restarted her vaginal progesterone last night and aware to continue until 37 weeks when cerclage is removed.     Summary of Plan  -Discussed modified bedrest until 37 weeks, pelvic rest, as well  - continue vaginal progesterone until 37 weeks     Follow-up: No follow up with MFM scheduled, but I am happy to see for follow up at request of primary obstetrician      Thank you for the consult and opportunity to care for this patient.   Please feel free to reach out with any questions or concerns.      I spent 20 minutes caring for this patient on this date of service. This time includes time spent by me in the following activities: preparing for the visit, reviewing tests, obtaining and/or reviewing a separately obtained history, performing a medically appropriate examination and/or evaluation, counseling and educating the patient/family/caregiver and independently interpreting results and communicating that information with the patient/family/caregiver with greater than 50% spent in counseling and coordination of care.     CHLOE Ingram  Maternal Fetal Medicine-[facility]  Office: [phone]  [email or web address]      Pt reports that she is doing well and denies vaginal bleeding, cramping, contractions or LOF at this time. Reports active fetal movement. Reviewed when to call OB office or present to L&D for evaluation with symptoms such as decreased fetal movement, vaginal bleeding, LOF or ctxs. Pt verbalized understanding. Denies HA, visual changes or epigastric pain. Denies any additional complaints at time of appointment. Next OB appointment scheduled for 2/9.    Reports she started taking vaginal progesterone again on 1/30 after cerclage on 1/19.     Vitals:    01/31/24 0846   BP: 107/58   Pulse: 73   Temp: 97.8 °F (36.6 °C)

## 2024-01-31 NOTE — PROGRESS NOTES
MATERNAL FETAL MEDICINE Consult Note    Dear Dr Meenu Villasenor MD:    Thank you for your kind referral of Marysol HILLIARD.  As you know, she is a 33 y.o.   at  22 5/7 weeks gestation (Estimated Date of Delivery: 24). This is a consult.      Her antepartum course is complicated by:  Short cervix, hx of term delivery    Aneuploidy Screening: none seen    HPI: Today, she denies headache, blurry vision, RUQ pain. No vaginal bleeding, no contractions.     Review of History:  Past Medical History:   Diagnosis Date    Placental abruption     PONV (postoperative nausea and vomiting)      Past Surgical History:   Procedure Laterality Date    CERVICAL CERCLAGE N/A 2024    Procedure: CERVICAL CERCLAGE;  Surgeon: Zuleyka Lin MD;  Location: [facility] OR;  Service: Obstetrics;  Laterality: N/A;     SECTION N/A 10/20/2021    Procedure:  SECTION PRIMARY;  Surgeon: Meenu Villasenor MD;  Location: [facility] LABOR DELIVERY;  Service: Obstetrics/Gynecology;  Laterality: N/A;    WISDOM TOOTH EXTRACTION         Social History     Socioeconomic History    Marital status:    Tobacco Use    Smoking status: Never     Passive exposure: Never    Smokeless tobacco: Never   Vaping Use    Vaping Use: Never used   Substance and Sexual Activity    Alcohol use: Not Currently    Drug use: Never    Sexual activity: Yes     Partners: Male     Birth control/protection: None     Comment: no BC     Family History   Problem Relation Age of Onset    Breast cancer Neg Hx     Ovarian cancer Neg Hx     Uterine cancer Neg Hx     Colon cancer Neg Hx       Allergies   Allergen Reactions    Adhesive Tape Irritability    Latex Swelling      Current Outpatient Medications on File Prior to Visit   Medication Sig Dispense Refill    doxylamine (UNISOM) 25 MG tablet Take 1 tablet by mouth At Night As Needed for Sleep.      prenatal vitamin (prenatal, CLASSIC, vitamin) tablet Take  by mouth Daily.      Progesterone  "(PROMETRIUM) 200 MG capsule Insert 1 capsule into the vagina every night at bedtime. 90 capsule 5    azithromycin (Zithromax Z-Agustin) 250 MG tablet Take 2 tablets by mouth on day 1, then 1 tablet daily on days 2-5 (Patient not taking: Reported on 2024) 6 tablet 0     No current facility-administered medications on file prior to visit.        Past obstetric, gynecological, medical, surgical, family and social history reviewed.  Relevant lab work and imaging reviewed.    Review of systems  Constitutional:  denies fever, chills, malaise.   ENT/Mouth:  denies sore throat, tinnitus  Eyes: denies vision changes/pain  CV:  denies chest pain  Respiratory:  denies cough/SOB  GI:  denies N/V, diarrhea, abdominal pain.    :   denies dysuria  Skin:  denies lesions or pruritus   Neuro:  denies weakness, focal neurologic symptoms    Vitals:    24 0846   BP: 107/58   BP Location: Right arm   Patient Position: Sitting   Pulse: 73   Temp: 97.8 °F (36.6 °C)   TempSrc: Temporal   Weight: 66.6 kg (146 lb 12.8 oz)   Height: 157.5 cm (62\")     PHYSICAL EXAM   GENERAL: Not in acute distress, AAOx3, pleasant  CARDIO: regular rate and rhythm  PULM: symmetric chest rise, speaking in complete sentences without difficulty  NEURO: awake, alert and oriented to person, place, and time  ABDOMINAL: No fundal tenderness, no rebound or guarding, gravid  EXTREMITIES: no bilateral lower extremity edema/tenderness  SKIN: Warm, well-perfused      ULTRASOUND   Please view full ultrasound note on Imaging tab in ViewPoint.  Breech presentation.  Posterior placenta.   Follow up anatomy appears normal  Transvaginal cervical length is 3.12 cm  1.8 cm from cerclage to external os    ASSESSMENT/COUNSELIN y.o.   at  22 5/7 weeks gestation (Estimated Date of Delivery: 24).     -Pregnancy  [ X ] stable  [   ] improving [  ] worsening    Diagnoses and all orders for this visit:    1. Short cervix affecting pregnancy (Primary)     "   Short cervix, 1.3 cm today in our office, from 1.8 earlier in week.      According to ACOG, incidentally detected short cervical length in the second trimester in the absence of a prior madsen  birth is not diagnostic of cervical insufficiency, and cerclage is not indicated in this setting. Vaginal progesterone is recommended as a management option to reduce the risk of  birth in asymptomatic women with a madsen gestation without a prior  birth with an incidentally identified very short cervical length. HOWEVER, we discussed based on her cervix <1 cm at Dr. Villasenor's office there is some data to offering cerclage for this indication and I would offer given what I am seeing on US, understanding that she doesn't meet the strict criteria.  Pt had cerclage placed on 2024 and did well. Pt denies any complaints today (no bleeding, cramping, etc).     We discussed modified bedrest, activity restriction, pelvic rest.  Pt restarted her vaginal progesterone last night and aware to continue until 37 weeks when cerclage is removed.     Summary of Plan  -Discussed modified bedrest until 37 weeks, pelvic rest, as well  - continue vaginal progesterone until 37 weeks     Follow-up: No follow up with MFM scheduled, but I am happy to see for follow up at request of primary obstetrician      Thank you for the consult and opportunity to care for this patient.  Please feel free to reach out with any questions or concerns.      I spent 20 minutes caring for this patient on this date of service. This time includes time spent by me in the following activities: preparing for the visit, reviewing tests, obtaining and/or reviewing a separately obtained history, performing a medically appropriate examination and/or evaluation, counseling and educating the patient/family/caregiver and independently interpreting results and communicating that information with the patient/family/caregiver with greater than 50%  spent in counseling and coordination of care.     CHLOE Ingram  Maternal Fetal Medicine-[facility]  Office: [phone]  [email or web address]

## 2024-01-31 NOTE — PROGRESS NOTES
Pt reports that she is doing well and denies vaginal bleeding, cramping, contractions or LOF at this time. Reports active fetal movement. Reviewed when to call OB office or present to L&D for evaluation with symptoms such as decreased fetal movement, vaginal bleeding, LOF or ctxs. Pt verbalized understanding. Denies HA, visual changes or epigastric pain. Denies any additional complaints at time of appointment. Next OB appointment scheduled for 2/9.    Reports she started taking vaginal progesterone again on 1/30 after cerclage on 1/19.     Vitals:    01/31/24 0846   BP: 107/58   Pulse: 73   Temp: 97.8 °F (36.6 °C)

## 2024-02-09 ENCOUNTER — ROUTINE PRENATAL (OUTPATIENT)
Dept: OBSTETRICS AND GYNECOLOGY | Age: 34
End: 2024-02-09
Payer: COMMERCIAL

## 2024-02-09 VITALS — BODY MASS INDEX: 26.34 KG/M2 | SYSTOLIC BLOOD PRESSURE: 104 MMHG | DIASTOLIC BLOOD PRESSURE: 80 MMHG | WEIGHT: 144 LBS

## 2024-02-09 DIAGNOSIS — O34.32 CERVICAL CERCLAGE SUTURE PRESENT IN SECOND TRIMESTER: ICD-10-CM

## 2024-02-09 DIAGNOSIS — Z98.891 PREVIOUS CESAREAN SECTION: ICD-10-CM

## 2024-02-09 DIAGNOSIS — Z34.82 PRENATAL CARE, SUBSEQUENT PREGNANCY IN SECOND TRIMESTER: Primary | ICD-10-CM

## 2024-02-09 DIAGNOSIS — O26.879 SHORT CERVIX AFFECTING PREGNANCY: ICD-10-CM

## 2024-02-09 DIAGNOSIS — Z13.89 SCREENING FOR BLOOD OR PROTEIN IN URINE: ICD-10-CM

## 2024-02-09 DIAGNOSIS — Z87.59 HISTORY OF PLACENTA ABRUPTION: ICD-10-CM

## 2024-02-09 LAB
BILIRUB BLD-MCNC: NEGATIVE MG/DL
GLUCOSE UR STRIP-MCNC: NEGATIVE MG/DL
KETONES UR QL: NEGATIVE
LEUKOCYTE EST, POC: ABNORMAL
NITRITE UR-MCNC: NEGATIVE MG/ML
PH UR: 7 [PH] (ref 5–8)
PROT UR STRIP-MCNC: NEGATIVE MG/DL
RBC # UR STRIP: NEGATIVE /UL
SP GR UR: 1.02 (ref 1–1.03)
UROBILINOGEN UR QL: NORMAL

## 2024-02-09 NOTE — PROGRESS NOTES
Chief Complaint   Patient presents with    Routine Prenatal Visit     Cc: ob check today , upd with flu vaccine , pt denies any VB or cramping , no ob complaints today        HPI: 33 y.o.  at 24w0d  presents for prenatal care     Vitals:    24 0928   BP: 104/80   Weight: 65.3 kg (144 lb)     Total weight gain for pregnancy:  3.175 kg (7 lb)    Review of systems:   Gen: negative  CV:     negative  GI: negative  :   negative and good fetal movement noted   MS:    negative  Neuro: negative  Pul: negative    A/P  1. Intrauterine pregnancy at 24w0d   2. Pregnancy Risk:  HIGH RISK        Diagnoses and all orders for this visit:    1. Prenatal care, subsequent pregnancy in second trimester (Primary)    2. Screening for blood or protein in urine  -     POC Urinalysis Dipstick    3. History of placenta abruption    4. Cervical cerclage suture present in second trimester    5. Short cervix affecting pregnancy    6. Previous  section         labor was discussed.  Warnings were provided.  -----------------------  PLAN:   Return in about 26 days (around 3/6/2024), or ob check and one-hour gtt and growth US.  Patient with cerclage in place now for shortened cervix-continue vaginal progesterone  Previous  section-schedule repeat at next visit   labor warnings    Meenu Villasenor MD  2024 12:54 EST

## 2024-03-06 ENCOUNTER — PREP FOR SURGERY (OUTPATIENT)
Dept: OTHER | Facility: HOSPITAL | Age: 34
End: 2024-03-06
Payer: COMMERCIAL

## 2024-03-06 ENCOUNTER — ROUTINE PRENATAL (OUTPATIENT)
Dept: OBSTETRICS AND GYNECOLOGY | Age: 34
End: 2024-03-06
Payer: COMMERCIAL

## 2024-03-06 VITALS — SYSTOLIC BLOOD PRESSURE: 104 MMHG | BODY MASS INDEX: 27.44 KG/M2 | DIASTOLIC BLOOD PRESSURE: 68 MMHG | WEIGHT: 150 LBS

## 2024-03-06 DIAGNOSIS — Z13.89 SCREENING FOR BLOOD OR PROTEIN IN URINE: ICD-10-CM

## 2024-03-06 DIAGNOSIS — Z13.0 SCREENING FOR IRON DEFICIENCY ANEMIA: ICD-10-CM

## 2024-03-06 DIAGNOSIS — Z98.891 PREVIOUS CESAREAN SECTION: ICD-10-CM

## 2024-03-06 DIAGNOSIS — Z13.1 SCREENING FOR DIABETES MELLITUS: ICD-10-CM

## 2024-03-06 DIAGNOSIS — Z34.82 PRENATAL CARE, SUBSEQUENT PREGNANCY IN SECOND TRIMESTER: Primary | ICD-10-CM

## 2024-03-06 DIAGNOSIS — Z98.891 PREVIOUS CESAREAN SECTION: Primary | ICD-10-CM

## 2024-03-06 DIAGNOSIS — O26.849 UTERINE SIZE DATE DISCREPANCY PREGNANCY: ICD-10-CM

## 2024-03-06 DIAGNOSIS — Z87.59 HISTORY OF PLACENTA ABRUPTION: ICD-10-CM

## 2024-03-06 DIAGNOSIS — O34.32 CERVICAL CERCLAGE SUTURE PRESENT IN SECOND TRIMESTER: ICD-10-CM

## 2024-03-06 DIAGNOSIS — Z23 ENCOUNTER FOR ADMINISTRATION OF VACCINE: ICD-10-CM

## 2024-03-06 DIAGNOSIS — O26.879 SHORT CERVIX AFFECTING PREGNANCY: ICD-10-CM

## 2024-03-06 LAB
BILIRUB BLD-MCNC: NEGATIVE MG/DL
GLUCOSE UR STRIP-MCNC: NEGATIVE MG/DL
KETONES UR QL: NEGATIVE
LEUKOCYTE EST, POC: ABNORMAL
NITRITE UR-MCNC: NEGATIVE MG/ML
PH UR: 7 [PH] (ref 5–8)
PROT UR STRIP-MCNC: NEGATIVE MG/DL
RBC # UR STRIP: NEGATIVE /UL
SP GR UR: 1.01 (ref 1–1.03)
UROBILINOGEN UR QL: NORMAL

## 2024-03-06 RX ORDER — METHYLERGONOVINE MALEATE 0.2 MG/ML
200 INJECTION INTRAVENOUS ONCE AS NEEDED
OUTPATIENT
Start: 2024-03-06

## 2024-03-06 RX ORDER — CEFAZOLIN SODIUM 2 G/100ML
2000 INJECTION, SOLUTION INTRAVENOUS ONCE
OUTPATIENT
Start: 2024-03-06 | End: 2024-03-06

## 2024-03-06 RX ORDER — OXYTOCIN/0.9 % SODIUM CHLORIDE 30/500 ML
250 PLASTIC BAG, INJECTION (ML) INTRAVENOUS CONTINUOUS
OUTPATIENT
Start: 2024-03-06 | End: 2024-03-06

## 2024-03-06 RX ORDER — OXYTOCIN/0.9 % SODIUM CHLORIDE 30/500 ML
999 PLASTIC BAG, INJECTION (ML) INTRAVENOUS ONCE
OUTPATIENT
Start: 2024-03-06 | End: 2024-03-06

## 2024-03-06 RX ORDER — LIDOCAINE HYDROCHLORIDE 10 MG/ML
0.5 INJECTION, SOLUTION INFILTRATION; PERINEURAL ONCE AS NEEDED
OUTPATIENT
Start: 2024-03-06

## 2024-03-06 RX ORDER — CARBOPROST TROMETHAMINE 250 UG/ML
250 INJECTION, SOLUTION INTRAMUSCULAR
OUTPATIENT
Start: 2024-03-06

## 2024-03-06 RX ORDER — SODIUM CHLORIDE 0.9 % (FLUSH) 0.9 %
10 SYRINGE (ML) INJECTION AS NEEDED
OUTPATIENT
Start: 2024-03-06

## 2024-03-06 RX ORDER — SODIUM CHLORIDE, SODIUM LACTATE, POTASSIUM CHLORIDE, CALCIUM CHLORIDE 600; 310; 30; 20 MG/100ML; MG/100ML; MG/100ML; MG/100ML
125 INJECTION, SOLUTION INTRAVENOUS CONTINUOUS
OUTPATIENT
Start: 2024-03-06

## 2024-03-06 RX ORDER — ACETAMINOPHEN 500 MG
1000 TABLET ORAL ONCE
OUTPATIENT
Start: 2024-03-06 | End: 2024-03-06

## 2024-03-06 RX ORDER — SODIUM CHLORIDE 0.9 % (FLUSH) 0.9 %
10 SYRINGE (ML) INJECTION EVERY 12 HOURS SCHEDULED
OUTPATIENT
Start: 2024-03-06

## 2024-03-06 RX ORDER — TRANEXAMIC ACID 10 MG/ML
1000 INJECTION, SOLUTION INTRAVENOUS ONCE AS NEEDED
OUTPATIENT
Start: 2024-03-06

## 2024-03-06 RX ORDER — MISOPROSTOL 200 UG/1
800 TABLET ORAL ONCE AS NEEDED
OUTPATIENT
Start: 2024-03-06

## 2024-03-06 RX ORDER — SODIUM CHLORIDE 9 MG/ML
40 INJECTION, SOLUTION INTRAVENOUS AS NEEDED
OUTPATIENT
Start: 2024-03-06

## 2024-03-06 RX ORDER — KETOROLAC TROMETHAMINE 30 MG/ML
30 INJECTION, SOLUTION INTRAMUSCULAR; INTRAVENOUS ONCE
OUTPATIENT
Start: 2024-03-06 | End: 2024-03-06

## 2024-03-06 NOTE — PROGRESS NOTES
Chief Complaint   Patient presents with    Routine Prenatal Visit     Cc:  ob check with one-hour gtt and growth US , pt reports good FM , no ob complaints , already  has a breast pump          HPI: 33 y.o.  at 27w5d presents for prenatal care    Vitals:    24 0838   BP: 104/68   Weight: 68 kg (150 lb)     Total weight gain for pregnancy:  5.897 kg (13 lb)    Review of systems:   Gen: negative  CV:     negative  GI: negative  :   negative and good fetal movement noted   MS:    negative  Neuro: negative  Pul: negative    A/P  1. Intrauterine pregnancy at 27w5d   2. Pregnancy Risk:  HIGH RISK        Diagnoses and all orders for this visit:    1. Prenatal care, subsequent pregnancy in second trimester (Primary)    2. Screening for iron deficiency anemia  -     POC Urinalysis Dipstick  -     Gestational Screen 1 Hr ([facility])  -     CBC (No Diff)  -     RPR, Rfx Qn RPR / Confirm TP ([facility])    3. Screening for diabetes mellitus  -     POC Urinalysis Dipstick  -     Gestational Screen 1 Hr ([facility])  -     CBC (No Diff)  -     RPR, Rfx Qn RPR / Confirm TP ([facility])    4. Encounter for administration of vaccine  -     POC Urinalysis Dipstick  -     Gestational Screen 1 Hr ([facility])  -     CBC (No Diff)  -     RPR, Rfx Qn RPR / Confirm TP ([facility])    5. Screening for blood or protein in urine  -     POC Urinalysis Dipstick  -     Gestational Screen 1 Hr ([facility])  -     CBC (No Diff)  -     RPR, Rfx Qn RPR / Confirm TP ([facility])    6. History of placenta abruption  -     Case Request    7. Uterine size date discrepancy pregnancy    8. Short cervix affecting pregnancy  -     Case Request    9. Cervical cerclage suture present in second trimester  -     Case Request    10. Previous  section  -     Case Request    Other orders  -     Tdap Vaccine Greater Than or Equal To 6yo IM          -----------------------  PLAN:   Return in about 19 days (around 3/25/2024), or ob check.  SCL - cerclage in place    S<D - normal fetal growth today  One-hour gtt and tdap today   RLTCS scheduled         Meenu Villasenor MD  3/6/2024 09:00 EST

## 2024-03-07 PROBLEM — O99.019 MATERNAL ANEMIA IN PREGNANCY, ANTEPARTUM: Status: ACTIVE | Noted: 2024-03-07

## 2024-03-07 LAB
ERYTHROCYTE [DISTWIDTH] IN BLOOD BY AUTOMATED COUNT: 13.6 % (ref 11.7–15.4)
GLUCOSE 1H P 50 G GLC PO SERPL-MCNC: 123 MG/DL (ref 70–139)
HCT VFR BLD AUTO: 32.6 % (ref 34–46.6)
HGB BLD-MCNC: 11 G/DL (ref 11.1–15.9)
MCH RBC QN AUTO: 32.4 PG (ref 26.6–33)
MCHC RBC AUTO-ENTMCNC: 33.7 G/DL (ref 31.5–35.7)
MCV RBC AUTO: 96 FL (ref 79–97)
PLATELET # BLD AUTO: 173 X10E3/UL (ref 150–450)
RBC # BLD AUTO: 3.4 X10E6/UL (ref 3.77–5.28)
RPR SER QL: NON REACTIVE
WBC # BLD AUTO: 6.7 X10E3/UL (ref 3.4–10.8)

## 2024-03-07 RX ORDER — FERROUS SULFATE 325(65) MG
325 TABLET ORAL
Qty: 90 TABLET | Refills: 3 | Status: SHIPPED | OUTPATIENT
Start: 2024-03-07

## 2024-03-07 NOTE — PROGRESS NOTES
Call patient and notify of normal results of 1 hour glucose test but mildly anemic please start iron sent to pharmacy.

## 2024-03-25 ENCOUNTER — ROUTINE PRENATAL (OUTPATIENT)
Dept: OBSTETRICS AND GYNECOLOGY | Age: 34
End: 2024-03-25
Payer: COMMERCIAL

## 2024-03-25 VITALS — DIASTOLIC BLOOD PRESSURE: 62 MMHG | WEIGHT: 154 LBS | BODY MASS INDEX: 28.17 KG/M2 | SYSTOLIC BLOOD PRESSURE: 102 MMHG

## 2024-03-25 DIAGNOSIS — Z87.59 HISTORY OF PLACENTA ABRUPTION: ICD-10-CM

## 2024-03-25 DIAGNOSIS — O26.879 SHORT CERVIX AFFECTING PREGNANCY: ICD-10-CM

## 2024-03-25 DIAGNOSIS — Z34.83 PRENATAL CARE, SUBSEQUENT PREGNANCY IN THIRD TRIMESTER: Primary | ICD-10-CM

## 2024-03-25 DIAGNOSIS — Z98.891 PREVIOUS CESAREAN SECTION: ICD-10-CM

## 2024-03-25 DIAGNOSIS — O99.019 MATERNAL ANEMIA IN PREGNANCY, ANTEPARTUM: ICD-10-CM

## 2024-03-25 DIAGNOSIS — Z13.89 SCREENING FOR BLOOD OR PROTEIN IN URINE: ICD-10-CM

## 2024-03-25 DIAGNOSIS — O26.849 UTERINE SIZE DATE DISCREPANCY PREGNANCY: ICD-10-CM

## 2024-03-25 DIAGNOSIS — O34.32 CERVICAL CERCLAGE SUTURE PRESENT IN SECOND TRIMESTER: ICD-10-CM

## 2024-03-25 LAB
BILIRUB BLD-MCNC: NEGATIVE MG/DL
GLUCOSE UR STRIP-MCNC: NEGATIVE MG/DL
KETONES UR QL: NEGATIVE
LEUKOCYTE EST, POC: ABNORMAL
NITRITE UR-MCNC: NEGATIVE MG/ML
PH UR: 7 [PH] (ref 5–8)
PROT UR STRIP-MCNC: ABNORMAL MG/DL
RBC # UR STRIP: NEGATIVE /UL
SP GR UR: 1.02 (ref 1–1.03)
UROBILINOGEN UR QL: ABNORMAL

## 2024-03-25 PROCEDURE — 0502F SUBSEQUENT PRENATAL CARE: CPT | Performed by: OBSTETRICS & GYNECOLOGY

## 2024-03-25 NOTE — PROGRESS NOTES
Chief Complaint   Patient presents with    Routine Prenatal Visit     Cc:  ob check  , daily iron still feeling fatigue , pt reports good FM , no ob complaints          HPI: 33 y.o.  at 30w3d presents for prenatal care    Vitals:    24 1008   BP: 102/62   Weight: 69.9 kg (154 lb)     Total weight gain for pregnancy:  7.711 kg (17 lb)    Review of systems:   Gen: fatigue  CV:     negative  GI: negative  :   Good fetal movement   MS:    negative  Neuro: negative  Pul: negative    A/P  1. Intrauterine pregnancy at 30w3d   2. Pregnancy Risk:  HIGH RISK        Diagnoses and all orders for this visit:    1. Screening for blood or protein in urine (Primary)  -     POC Urinalysis Dipstick    2. Cervical cerclage suture present in second trimester    3. Uterine size date discrepancy pregnancy    4. Short cervix affecting pregnancy    5. Previous  section    6. Maternal anemia in pregnancy, antepartum    7. History of placenta abruption    8. Prenatal care, subsequent pregnancy in third trimester        Nutrition and weight gain were addressed.  -----------------------  PLAN:   Return in about 2 weeks (around 2024), or ob check and growth US.  Previous  section-repeat scheduled at 39 weeks  Declines tubal  Short cervix-cerclage in place-movement 36 to 37 weeks    Meenu Villasenor MD  3/25/2024 10:22 EDT

## 2024-04-08 ENCOUNTER — ROUTINE PRENATAL (OUTPATIENT)
Dept: OBSTETRICS AND GYNECOLOGY | Age: 34
End: 2024-04-08
Payer: COMMERCIAL

## 2024-04-08 ENCOUNTER — TRANSCRIBE ORDERS (OUTPATIENT)
Dept: ULTRASOUND IMAGING | Facility: HOSPITAL | Age: 34
End: 2024-04-08
Payer: COMMERCIAL

## 2024-04-08 VITALS — DIASTOLIC BLOOD PRESSURE: 64 MMHG | BODY MASS INDEX: 28.35 KG/M2 | WEIGHT: 155 LBS | SYSTOLIC BLOOD PRESSURE: 104 MMHG

## 2024-04-08 DIAGNOSIS — O36.5939 SGA (SMALL FOR GESTATIONAL AGE), FETAL, AFFECTING CARE OF MOTHER, ANTEPARTUM, THIRD TRIMESTER, OTHER FETUS: ICD-10-CM

## 2024-04-08 DIAGNOSIS — O28.8 AFI (AMNIOTIC FLUID INDEX) BORDERLINE LOW: ICD-10-CM

## 2024-04-08 DIAGNOSIS — O26.849 UTERINE SIZE DATE DISCREPANCY PREGNANCY: ICD-10-CM

## 2024-04-08 DIAGNOSIS — Z87.59 HISTORY OF PLACENTA ABRUPTION: ICD-10-CM

## 2024-04-08 DIAGNOSIS — Z13.89 SCREENING FOR BLOOD OR PROTEIN IN URINE: ICD-10-CM

## 2024-04-08 DIAGNOSIS — O34.33 CERVICAL CERCLAGE SUTURE PRESENT IN THIRD TRIMESTER: ICD-10-CM

## 2024-04-08 DIAGNOSIS — O99.019 MATERNAL ANEMIA IN PREGNANCY, ANTEPARTUM: ICD-10-CM

## 2024-04-08 DIAGNOSIS — N88.3 SHORT CERVIX: Primary | ICD-10-CM

## 2024-04-08 DIAGNOSIS — Z34.83 PRENATAL CARE, SUBSEQUENT PREGNANCY IN THIRD TRIMESTER: Primary | ICD-10-CM

## 2024-04-08 DIAGNOSIS — Z98.891 PREVIOUS CESAREAN SECTION: ICD-10-CM

## 2024-04-08 DIAGNOSIS — O26.879 SHORT CERVIX AFFECTING PREGNANCY: ICD-10-CM

## 2024-04-08 PROCEDURE — 0502F SUBSEQUENT PRENATAL CARE: CPT | Performed by: OBSTETRICS & GYNECOLOGY

## 2024-04-08 NOTE — PROGRESS NOTES
Chief Complaint   Patient presents with    Routine Prenatal Visit     Cc:  ob check with growth Us , good FM , no ob complaints today        HPI: 33 y.o.  at 32w3d presents for prenatal care    Vitals:    24 0841   BP: 104/64   Weight: 70.3 kg (155 lb)     Total weight gain for pregnancy:  8.165 kg (18 lb)    Review of systems:   Gen: negative  CV:     negative  GI: negative  :   negative and good fetal movement noted   MS:    negative  Neuro: negative  Pul: negative    A/P  1. Intrauterine pregnancy at 32w3d   2. Pregnancy Risk:  HIGH RISK        Diagnoses and all orders for this visit:    1. Prenatal care, subsequent pregnancy in third trimester (Primary)    2. Screening for blood or protein in urine  -     POC Urinalysis Dipstick    3. Uterine size date discrepancy pregnancy  -     Ambulatory Referral to [facility]/Perinatology    4. Maternal anemia in pregnancy, antepartum  -     Ambulatory Referral to [facility]/Perinatology    5. Previous  section    6. History of placenta abruption    7. Cervical cerclage suture present in third trimester  -     Ambulatory Referral to [facility]/Perinatology    8. Short cervix affecting pregnancy  -     Ambulatory Referral to [facility]/Perinatology    9. MAR (amniotic fluid index) borderline low  -     Ambulatory Referral to [facility]/Perinatology    10. SGA (small for gestational age), fetal, affecting care of mother, antepartum, third trimester, other fetus  -     Ambulatory Referral to [facility]/Perinatology         labor was discussed.  Warnings were provided.  Nutrition and weight gain were addressed.  -----------------------  PLAN:   Return in about 2 weeks (around 2024), or ob check and BPP.  Trending IUGR today-BPP 8 out of 8  [facility]  [facility] will [facility] first of next week  Strict fetal kick counts given  Borderline low MAR-repeat 2 days  Cerclage in place-table    Meenu Villasenor MD  2024 09:16 EDT

## 2024-04-15 ENCOUNTER — OFFICE VISIT (OUTPATIENT)
Dept: OBSTETRICS AND GYNECOLOGY | Facility: CLINIC | Age: 34
End: 2024-04-15
Payer: COMMERCIAL

## 2024-04-15 ENCOUNTER — HOSPITAL ENCOUNTER (OUTPATIENT)
Dept: ULTRASOUND IMAGING | Facility: HOSPITAL | Age: 34
Discharge: HOME | End: 2024-04-15
Admitting: OBSTETRICS & GYNECOLOGY
Payer: COMMERCIAL

## 2024-04-15 VITALS
SYSTOLIC BLOOD PRESSURE: 111 MMHG | HEIGHT: 62 IN | HEART RATE: 89 BPM | TEMPERATURE: 97.7 F | DIASTOLIC BLOOD PRESSURE: 62 MMHG | BODY MASS INDEX: 29.08 KG/M2 | WEIGHT: 158 LBS

## 2024-04-15 DIAGNOSIS — O28.8 AFI (AMNIOTIC FLUID INDEX) BORDERLINE LOW: ICD-10-CM

## 2024-04-15 DIAGNOSIS — O99.019 MATERNAL ANEMIA IN PREGNANCY, ANTEPARTUM: ICD-10-CM

## 2024-04-15 DIAGNOSIS — O26.879 SHORT CERVIX AFFECTING PREGNANCY: ICD-10-CM

## 2024-04-15 DIAGNOSIS — N88.3 SHORT CERVIX: ICD-10-CM

## 2024-04-15 DIAGNOSIS — Z03.74 PROBLEM WITH FETAL GROWTH NOT FOUND: Primary | ICD-10-CM

## 2024-04-15 DIAGNOSIS — O26.849 UTERINE SIZE DATE DISCREPANCY PREGNANCY: ICD-10-CM

## 2024-04-15 DIAGNOSIS — O36.5939 SGA (SMALL FOR GESTATIONAL AGE), FETAL, AFFECTING CARE OF MOTHER, ANTEPARTUM, THIRD TRIMESTER, OTHER FETUS: ICD-10-CM

## 2024-04-15 PROCEDURE — 76816 OB US FOLLOW-UP PER FETUS: CPT

## 2024-04-15 PROCEDURE — 76819 FETAL BIOPHYS PROFIL W/O NST: CPT

## 2024-04-15 PROCEDURE — 76818 FETAL BIOPHYS PROFILE W/NST: CPT | Performed by: OBSTETRICS & GYNECOLOGY

## 2024-04-15 PROCEDURE — 76816 OB US FOLLOW-UP PER FETUS: CPT | Performed by: OBSTETRICS & GYNECOLOGY

## 2024-04-15 PROCEDURE — 99213 OFFICE O/P EST LOW 20 MIN: CPT | Performed by: OBSTETRICS & GYNECOLOGY

## 2024-04-15 NOTE — LETTER
April 15, 2024       No Recipients    Patient: Marysol Malhotra   YOB: 1990   Date of Visit: 4/15/2024       Dear Meenu Villasenor MD    Marysol Malhotra was in my office today. Below is a copy of my note.    If you have questions, please do not hesitate to call me. I look forward to following Marysol along with you.         Sincerely,        Zuleyka Lin MD    MATERNAL FETAL MEDICINE Consult Note    Dear Dr Meenu Villasenor MD:    Thank you for your kind referral of Marysol Malhotra.  As you know, she is a 33 y.o.   at  20 6/7 weeks gestation (Estimated Date of Delivery: 24). This is a consult.      Her antepartum course is complicated by:  Short cervix, hx of term delivery, progressively shortening    Aneuploidy Screening: none seen    HPI: Today, she denies headache, blurry vision, RUQ pain. No vaginal bleeding, no contractions.     Review of History:  Past Medical History:   Diagnosis Date   • Placental abruption    • PONV (postoperative nausea and vomiting)      Past Surgical History:   Procedure Laterality Date   • CERVICAL CERCLAGE N/A 2024    Procedure: CERVICAL CERCLAGE;  Surgeon: Zuleyka Lin MD;  Location: [facility] MAIN OR;  Service: Obstetrics;  Laterality: N/A;   •  SECTION N/A 10/20/2021    Procedure:  SECTION PRIMARY;  Surgeon: Meenu Villasenor MD;  Location: [facility] LABOR DELIVERY;  Service: Obstetrics/Gynecology;  Laterality: N/A;   • WISDOM TOOTH EXTRACTION         Social History     Socioeconomic History   • Marital status:    Tobacco Use   • Smoking status: Never     Passive exposure: Never   • Smokeless tobacco: Never   Vaping Use   • Vaping status: Never Used   Substance and Sexual Activity   • Alcohol use: Not Currently   • Drug use: Never   • Sexual activity: Yes     Partners: Male     Birth control/protection: None     Comment: no BC     Family History   Problem Relation Age of Onset   • Breast cancer Neg Hx    • Ovarian cancer Neg Hx   "  • Uterine cancer Neg Hx    • Colon cancer Neg Hx       Allergies   Allergen Reactions   • Adhesive Tape Irritability   • Latex Swelling      Current Outpatient Medications on File Prior to Visit   Medication Sig Dispense Refill   • ferrous sulfate 325 (65 FE) MG tablet Take 1 tablet by mouth Daily With Breakfast. 90 tablet 3   • prenatal vitamin (prenatal, CLASSIC, vitamin) tablet Take  by mouth Daily.     • Progesterone (PROMETRIUM) 200 MG capsule Insert 1 capsule into the vagina every night at bedtime. (Patient not taking: Reported on 4/15/2024) 90 capsule 5     No current facility-administered medications on file prior to visit.        Past obstetric, gynecological, medical, surgical, family and social history reviewed.  Relevant lab work and imaging reviewed.    Review of systems  Constitutional:  denies fever, chills, malaise.   ENT/Mouth:  denies sore throat, tinnitus  Eyes: denies vision changes/pain  CV:  denies chest pain  Respiratory:  denies cough/SOB  GI:  denies N/V, diarrhea, abdominal pain.    :   denies dysuria  Skin:  denies lesions or pruritus   Neuro:  denies weakness, focal neurologic symptoms    Vitals:    04/15/24 1100   BP: 111/62   BP Location: Right arm   Patient Position: Sitting   Pulse: 89   Temp: 97.7 °F (36.5 °C)   TempSrc: Temporal   Weight: 71.7 kg (158 lb)   Height: 157.5 cm (62\")       PHYSICAL EXAM   GENERAL: Not in acute distress, AAOx3, pleasant  CARDIO: regular rate and rhythm  PULM: symmetric chest rise, speaking in complete sentences without difficulty  NEURO: awake, alert and oriented to person, place, and time  ABDOMINAL: No fundal tenderness, no rebound or guarding, gravid  EXTREMITIES: no bilateral lower extremity edema/tenderness  SKIN: Warm, well-perfused      ULTRASOUND   Please view full ultrasound note on Imaging tab in ViewPoint.  Cephalic presentation.  Posterior placenta.  MAR 16 cm, which is normal.   EFW 1936 g (27%, AC 19%)  BPP 6/8 (-2 breathing).  "     NST:  Findings:  140/mod/+acc/no dec  [place] quiet    Duration: 20 min    Indication:  BPP, now 8/10      ASSESSMENT/COUNSELIN y.o.   at  33 3/7 weeks gestation (Estimated Date of Delivery: 24).     -Pregnancy  [ X ] stable  [   ] improving [  ] worsening    Diagnoses and all orders for this visit:    1. Problem with fetal growth not found (Primary)    2. Short cervix affecting pregnancy         Short cervix, 1.3 cm today in our office, from 1.8 earlier in week.    Has cerclage.  Out at 36-37 weeks.  Continue vaginal progesterone.      FGR, not found:  Not found.  Reviewed measurements--appear appropriate.  Baby very active but did not do 30s of practice breathing so got NST. Recommend BPP next week at primary OB and follow up growth in 2-3 weeks at primary OB.  If concerns in future please feel free to reconsult.        Summary of Plan  -Admit, 24 hours of ancef/indocin  -2-3 hours of toco then can D/c  -NPO after MN for cerclage at 7AM  -Home tomorrow with 48 hours of indocin and Zpack.       Follow-up: 1-2 weeks post op    Thank you for the consult and opportunity to care for this patient.  Please feel free to reach out with any questions or concerns.      I spent 15 minutes caring for this patient on this date of service. This time includes time spent by me in the following activities: preparing for the visit, reviewing tests, obtaining and/or reviewing a separately obtained history, performing a medically appropriate examination and/or evaluation, counseling and educating the patient/family/caregiver and independently interpreting results and communicating that information with the patient/family/caregiver with greater than 50% spent in counseling and coordination of care.       I spent 3 minutes on the separately reported service of US imaging not included in the time used to support the E/M service also reported today.      Zuleyka Lin MD [facility]  [facility]  [facility]  Office: [phone]  [email or web address]

## 2024-04-15 NOTE — PROGRESS NOTES
Reason for test:  BPP 6/8 (-2 breathing)  Date of Test: 4/15/2024  Time frame of test: 1094-3963  RN NST Interpretation:  Reactive. Moderate variability, +Accelerations, no Decelerations. Active fetal movements throughout the NST.

## 2024-04-15 NOTE — PROGRESS NOTES
MATERNAL FETAL MEDICINE Consult Note    Dear Dr Meenu Villasenor MD:    Thank you for your kind referral of Marysol Malhotra.  As you know, she is a 33 y.o.   at  20 6/7 weeks gestation (Estimated Date of Delivery: 24). This is a consult.      Her antepartum course is complicated by:  Short cervix, hx of term delivery, progressively shortening    Aneuploidy Screening: none seen    HPI: Today, she denies headache, blurry vision, RUQ pain. No vaginal bleeding, no contractions.     Review of History:  Past Medical History:   Diagnosis Date    Placental abruption     PONV (postoperative nausea and vomiting)      Past Surgical History:   Procedure Laterality Date    CERVICAL CERCLAGE N/A 2024    Procedure: CERVICAL CERCLAGE;  Surgeon: Zuleyka Lin MD;  Location: [facility] OR;  Service: Obstetrics;  Laterality: N/A;     SECTION N/A 10/20/2021    Procedure:  SECTION PRIMARY;  Surgeon: Meenu Villasenor MD;  Location: [facility] LABOR DELIVERY;  Service: Obstetrics/Gynecology;  Laterality: N/A;    WISDOM TOOTH EXTRACTION         Social History     Socioeconomic History    Marital status:    Tobacco Use    Smoking status: Never     Passive exposure: Never    Smokeless tobacco: Never   Vaping Use    Vaping status: Never Used   Substance and Sexual Activity    Alcohol use: Not Currently    Drug use: Never    Sexual activity: Yes     Partners: Male     Birth control/protection: None     Comment: no BC     Family History   Problem Relation Age of Onset    Breast cancer Neg Hx     Ovarian cancer Neg Hx     Uterine cancer Neg Hx     Colon cancer Neg Hx       Allergies   Allergen Reactions    Adhesive Tape Irritability    Latex Swelling      Current Outpatient Medications on File Prior to Visit   Medication Sig Dispense Refill    ferrous sulfate 325 (65 FE) MG tablet Take 1 tablet by mouth Daily With Breakfast. 90 tablet 3    prenatal vitamin (prenatal, CLASSIC, vitamin) tablet Take   "by mouth Daily.      Progesterone (PROMETRIUM) 200 MG capsule Insert 1 capsule into the vagina every night at bedtime. (Patient not taking: Reported on 4/15/2024) 90 capsule 5     No current facility-administered medications on file prior to visit.        Past obstetric, gynecological, medical, surgical, family and social history reviewed.  Relevant lab work and imaging reviewed.    Review of systems  Constitutional:  denies fever, chills, malaise.   ENT/Mouth:  denies sore throat, tinnitus  Eyes: denies vision changes/pain  CV:  denies chest pain  Respiratory:  denies cough/SOB  GI:  denies N/V, diarrhea, abdominal pain.    :   denies dysuria  Skin:  denies lesions or pruritus   Neuro:  denies weakness, focal neurologic symptoms    Vitals:    04/15/24 1100   BP: 111/62   BP Location: Right arm   Patient Position: Sitting   Pulse: 89   Temp: 97.7 °F (36.5 °C)   TempSrc: Temporal   Weight: 71.7 kg (158 lb)   Height: 157.5 cm (62\")       PHYSICAL EXAM   GENERAL: Not in acute distress, AAOx3, pleasant  CARDIO: regular rate and rhythm  PULM: symmetric chest rise, speaking in complete sentences without difficulty  NEURO: awake, alert and oriented to person, place, and time  ABDOMINAL: No fundal tenderness, no rebound or guarding, gravid  EXTREMITIES: no bilateral lower extremity edema/tenderness  SKIN: Warm, well-perfused      ULTRASOUND   Please view full ultrasound note on Imaging tab in ViewPoint.  Cephalic presentation.  Posterior placenta.  MAR 16 cm, which is normal.   EFW 1936 g (27%, AC 19%)  BPP 6/8 (-2 breathing).      NST:  Findings:  140/mod/+acc/no dec  [place] quiet    Duration: 20 min    Indication: 6/8 BPP, now 8/10      ASSESSMENT/COUNSELIN y.o.   at  33 3/7 weeks gestation (Estimated Date of Delivery: 24).     -Pregnancy  [ X ] stable  [   ] improving [  ] worsening    Diagnoses and all orders for this visit:    1. Problem with fetal growth not found (Primary)    2. Short cervix " affecting pregnancy         Short cervix, 1.3 cm today in our office, from 1.8 earlier in week.    Has cerclage.  Out at 36-37 weeks.  Continue vaginal progesterone.      FGR, not found:  Not found.  Reviewed measurements--appear appropriate.  Baby very active but did not do 30s of practice breathing so got NST. Recommend BPP next week at primary OB and follow up growth in 2-3 weeks at primary OB.  If concerns in future please feel free to reconsult.        Summary of Plan  -Admit, 24 hours of ancef/indocin  -2-3 hours of toco then can D/c  -NPO after MN for cerclage at 7AM  -Home tomorrow with 48 hours of indocin and Zpack.       Follow-up: 1-2 weeks post op    Thank you for the consult and opportunity to care for this patient.  Please feel free to reach out with any questions or concerns.      I spent 15 minutes caring for this patient on this date of service. This time includes time spent by me in the following activities: preparing for the visit, reviewing tests, obtaining and/or reviewing a separately obtained history, performing a medically appropriate examination and/or evaluation, counseling and educating the patient/family/caregiver and independently interpreting results and communicating that information with the patient/family/caregiver with greater than 50% spent in counseling and coordination of care.       I spent 3 minutes on the separately reported service of US imaging not included in the time used to support the E/M service also reported today.      Zuleyka Lin MD FACOG  Maternal Fetal Medicine-[facility]  Office: [phone]  [email or web address]

## 2024-04-22 ENCOUNTER — ROUTINE PRENATAL (OUTPATIENT)
Dept: OBSTETRICS AND GYNECOLOGY | Age: 34
End: 2024-04-22
Payer: COMMERCIAL

## 2024-04-22 VITALS — BODY MASS INDEX: 29.45 KG/M2 | WEIGHT: 161 LBS | DIASTOLIC BLOOD PRESSURE: 64 MMHG | SYSTOLIC BLOOD PRESSURE: 102 MMHG

## 2024-04-22 DIAGNOSIS — Z98.891 PREVIOUS CESAREAN SECTION: ICD-10-CM

## 2024-04-22 DIAGNOSIS — O26.879 SHORT CERVIX AFFECTING PREGNANCY: ICD-10-CM

## 2024-04-22 DIAGNOSIS — O34.33 CERVICAL CERCLAGE SUTURE PRESENT IN THIRD TRIMESTER: ICD-10-CM

## 2024-04-22 DIAGNOSIS — Z87.59 HISTORY OF PLACENTA ABRUPTION: ICD-10-CM

## 2024-04-22 DIAGNOSIS — Z34.83 PRENATAL CARE, SUBSEQUENT PREGNANCY IN THIRD TRIMESTER: Primary | ICD-10-CM

## 2024-04-22 DIAGNOSIS — O36.5939 SGA (SMALL FOR GESTATIONAL AGE), FETAL, AFFECTING CARE OF MOTHER, ANTEPARTUM, THIRD TRIMESTER, OTHER FETUS: ICD-10-CM

## 2024-04-22 DIAGNOSIS — Z13.89 SCREENING FOR BLOOD OR PROTEIN IN URINE: ICD-10-CM

## 2024-04-22 DIAGNOSIS — O99.019 MATERNAL ANEMIA IN PREGNANCY, ANTEPARTUM: ICD-10-CM

## 2024-04-22 DIAGNOSIS — O28.8 AFI (AMNIOTIC FLUID INDEX) BORDERLINE LOW: ICD-10-CM

## 2024-04-22 PROCEDURE — 0502F SUBSEQUENT PRENATAL CARE: CPT | Performed by: OBSTETRICS & GYNECOLOGY

## 2024-04-22 NOTE — PROGRESS NOTES
Chief Complaint   Patient presents with    Routine Prenatal Visit     Cc: ob check with Bpp Us today , had an episode of nose bleed this morning , reports good FM , no other complaints , unable to locate the urine        HPI: 33 y.o.  at 34w3d presents for prenatal care    Last OB US growth (since 2023)         Value Time User    EFW%ILE  15%ile 2024  8:42 AM Meenu Villasenor MD    AC%ILE  9%ile 2024  8:42 AM Meenu Villasenor MD    FETAL SURVEY  Incomplete 1/15/2024  9:38 AM Meenu Villasenor MD          Vitals:    24 0901   BP: 102/64   Weight: 73 kg (161 lb)     Total weight gain for pregnancy:  10.9 kg (24 lb)    Review of systems:   Gen: negative  CV:     negative  GI: negative  :   negative and good fetal movement noted   MS:    negative  Neuro: negative  Pul: negative    A/P  1. Intrauterine pregnancy at 34w3d   2. Pregnancy Risk:  HIGH RISK    Diagnoses and all orders for this visit:    1. Prenatal care, subsequent pregnancy in third trimester (Primary)    2. Screening for blood or protein in urine  -     Cancel: POC Urinalysis Dipstick    3. MAR (amniotic fluid index) borderline low    4. Cervical cerclage suture present in third trimester    5. History of placenta abruption    6. Previous  section    7. Short cervix affecting pregnancy    8. Maternal anemia in pregnancy, antepartum    9. SGA (small for gestational age), fetal, affecting care of mother, antepartum, third trimester, other fetus          -----------------------  PLAN:   Return in about 1 week (around 2024), or ob check and BPP.  Anemia-continue iron  Repeat section at 39 weeks  SGA-BPP today 8 out of 8  Normal MAR      Meenu Villasenor MD  2024 09:28 EDT

## 2024-04-29 ENCOUNTER — ROUTINE PRENATAL (OUTPATIENT)
Dept: OBSTETRICS AND GYNECOLOGY | Age: 34
End: 2024-04-29
Payer: COMMERCIAL

## 2024-04-29 VITALS — DIASTOLIC BLOOD PRESSURE: 64 MMHG | BODY MASS INDEX: 29.45 KG/M2 | SYSTOLIC BLOOD PRESSURE: 104 MMHG | WEIGHT: 161 LBS

## 2024-04-29 DIAGNOSIS — O99.019 MATERNAL ANEMIA IN PREGNANCY, ANTEPARTUM: ICD-10-CM

## 2024-04-29 DIAGNOSIS — Z87.59 HISTORY OF PLACENTA ABRUPTION: ICD-10-CM

## 2024-04-29 DIAGNOSIS — O34.33 CERVICAL CERCLAGE SUTURE PRESENT IN THIRD TRIMESTER: ICD-10-CM

## 2024-04-29 DIAGNOSIS — O26.849 UTERINE SIZE DATE DISCREPANCY PREGNANCY: ICD-10-CM

## 2024-04-29 DIAGNOSIS — Z98.891 PREVIOUS CESAREAN SECTION: ICD-10-CM

## 2024-04-29 DIAGNOSIS — O26.879 SHORT CERVIX AFFECTING PREGNANCY: ICD-10-CM

## 2024-04-29 DIAGNOSIS — Z13.89 SCREENING FOR BLOOD OR PROTEIN IN URINE: Primary | ICD-10-CM

## 2024-04-29 DIAGNOSIS — Z36.85 ANTENATAL SCREENING FOR STREPTOCOCCUS B: ICD-10-CM

## 2024-04-29 PROCEDURE — 0502F SUBSEQUENT PRENATAL CARE: CPT | Performed by: OBSTETRICS & GYNECOLOGY

## 2024-04-29 NOTE — PROGRESS NOTES
Chief Complaint   Patient presents with    Routine Prenatal Visit     Cc: ob check with BPP us today , GBS today , reports good FM , reports no ob complaints today        HPI: 33 y.o.  at 35w3d presents for prenatal care    Last OB US growth (since 2023)         Value Time User    EFW%ILE  15%ile 2024  8:42 AM Meenu Villasenor MD    AC%ILE  9%ile 2024  8:42 AM Meenu Villasenor MD    FETAL SURVEY  Incomplete 1/15/2024  9:38 AM Meenu Villasenor MD          Vitals:    24 0845   BP: 104/64   Weight: 73 kg (161 lb)     Total weight gain for pregnancy:  10.9 kg (24 lb)    Review of systems:   Gen: negative  CV:     negative  GI: negative  :   negative and good fetal movement noted   MS:    negative  Neuro: negative  Pul: negative    A/P  1. Intrauterine pregnancy at 35w3d   2. Pregnancy Risk:  HIGH RISK    Diagnoses and all orders for this visit:    1. Screening for blood or protein in urine (Primary)  -     POC Urinalysis Dipstick    2.  screening for streptococcus B  -     Group B Streptococcus Culture - Swab, Vaginal/Rectum    3. Maternal anemia in pregnancy, antepartum    4. Cervical cerclage suture present in third trimester    5. Uterine size date discrepancy pregnancy    6. Short cervix affecting pregnancy    7. Previous  section    8. History of placenta abruption         labor was discussed.  Warnings were provided.  Nutrition and weight gain were addressed.  -----------------------  PLAN:   Return in about 1 week (around 2024), or ob check with NP and BPP.  Cerclage in place -removal at 37 weeks  GBS collected  BPP today 8 out of 8    Meenu Villasenor MD  2024 09:15 EDT

## 2024-05-02 LAB — B-HEM STREP SPEC QL CULT: NEGATIVE

## 2024-05-06 ENCOUNTER — ROUTINE PRENATAL (OUTPATIENT)
Dept: OBSTETRICS AND GYNECOLOGY | Age: 34
End: 2024-05-06
Payer: COMMERCIAL

## 2024-05-06 VITALS — WEIGHT: 162 LBS | SYSTOLIC BLOOD PRESSURE: 102 MMHG | DIASTOLIC BLOOD PRESSURE: 68 MMHG | BODY MASS INDEX: 29.63 KG/M2

## 2024-05-06 DIAGNOSIS — Z98.891 PREVIOUS CESAREAN SECTION: ICD-10-CM

## 2024-05-06 DIAGNOSIS — Z87.59 HISTORY OF PLACENTA ABRUPTION: ICD-10-CM

## 2024-05-06 DIAGNOSIS — Z3A.36 36 WEEKS GESTATION OF PREGNANCY: Primary | ICD-10-CM

## 2024-05-06 DIAGNOSIS — O26.879 SHORT CERVIX AFFECTING PREGNANCY: ICD-10-CM

## 2024-05-06 DIAGNOSIS — O34.33 CERVICAL CERCLAGE SUTURE PRESENT IN THIRD TRIMESTER: ICD-10-CM

## 2024-05-06 DIAGNOSIS — O99.019 MATERNAL ANEMIA IN PREGNANCY, ANTEPARTUM: ICD-10-CM

## 2024-05-06 LAB
GLUCOSE UR STRIP-MCNC: NEGATIVE MG/DL
PROT UR STRIP-MCNC: NEGATIVE MG/DL

## 2024-05-06 PROCEDURE — 0502F SUBSEQUENT PRENATAL CARE: CPT | Performed by: NURSE PRACTITIONER

## 2024-05-07 ENCOUNTER — TELEPHONE (OUTPATIENT)
Dept: OBSTETRICS AND GYNECOLOGY | Age: 34
End: 2024-05-07
Payer: COMMERCIAL

## 2024-05-07 PROBLEM — O28.9 ABNORMAL ANTENATAL TEST: Status: ACTIVE | Noted: 2024-05-07

## 2024-05-08 ENCOUNTER — TELEPHONE (OUTPATIENT)
Dept: OBSTETRICS AND GYNECOLOGY | Age: 34
End: 2024-05-08

## 2024-05-13 ENCOUNTER — ROUTINE PRENATAL (OUTPATIENT)
Dept: OBSTETRICS AND GYNECOLOGY | Age: 34
End: 2024-05-13
Payer: COMMERCIAL

## 2024-05-13 VITALS — DIASTOLIC BLOOD PRESSURE: 70 MMHG | BODY MASS INDEX: 30 KG/M2 | WEIGHT: 164 LBS | SYSTOLIC BLOOD PRESSURE: 110 MMHG

## 2024-05-13 DIAGNOSIS — O26.849 UTERINE SIZE DATE DISCREPANCY PREGNANCY: ICD-10-CM

## 2024-05-13 DIAGNOSIS — Z13.89 SCREENING FOR BLOOD OR PROTEIN IN URINE: ICD-10-CM

## 2024-05-13 DIAGNOSIS — Z34.83 PRENATAL CARE, SUBSEQUENT PREGNANCY IN THIRD TRIMESTER: Primary | ICD-10-CM

## 2024-05-13 DIAGNOSIS — Z98.891 PREVIOUS CESAREAN SECTION: ICD-10-CM

## 2024-05-13 DIAGNOSIS — O99.019 MATERNAL ANEMIA IN PREGNANCY, ANTEPARTUM: ICD-10-CM

## 2024-05-13 DIAGNOSIS — O34.33 CERVICAL CERCLAGE SUTURE PRESENT IN THIRD TRIMESTER: ICD-10-CM

## 2024-05-13 DIAGNOSIS — Z87.59 HISTORY OF PLACENTA ABRUPTION: ICD-10-CM

## 2024-05-13 DIAGNOSIS — O26.879 SHORT CERVIX AFFECTING PREGNANCY: ICD-10-CM

## 2024-05-13 PROBLEM — O28.9 ABNORMAL ANTENATAL TEST: Status: RESOLVED | Noted: 2024-05-07 | Resolved: 2024-05-13

## 2024-05-13 PROBLEM — O34.30 CERVICAL CERCLAGE SUTURE PRESENT: Status: RESOLVED | Noted: 2024-01-23 | Resolved: 2024-05-13

## 2024-05-13 LAB
BILIRUB BLD-MCNC: NEGATIVE MG/DL
GLUCOSE UR STRIP-MCNC: NEGATIVE MG/DL
KETONES UR QL: NEGATIVE
LEUKOCYTE EST, POC: NEGATIVE
NITRITE UR-MCNC: NEGATIVE MG/ML
PH UR: 6 [PH] (ref 5–8)
PROT UR STRIP-MCNC: NEGATIVE MG/DL
RBC # UR STRIP: NEGATIVE /UL
SP GR UR: 1.01 (ref 1–1.03)
UROBILINOGEN UR QL: NORMAL

## 2024-05-13 PROCEDURE — 0502F SUBSEQUENT PRENATAL CARE: CPT | Performed by: OBSTETRICS & GYNECOLOGY

## 2024-05-13 NOTE — PROGRESS NOTES
Speculum placed in the vagina.  Cerclage not grasped with Hazel forceps.  Long Mayos used to ligate the cerclage to the right of the knot.  Cerclage removed without difficulty.

## 2024-05-13 NOTE — PROGRESS NOTES
Chief Complaint   Patient presents with    Routine Prenatal Visit     Cc: ob check with BPP  Us , GBS neg , daily iron , pt reports good FM , no ob complaints , pt wants cervical check today -Cervical cerclage removal today          HPI: 33 y.o.  at 37w3d presents for prenatal care and cerclage removal    Last OB US growth (since 2023)         Value Time User    EFW%ILE  37%ile 2024 11:29 AM Meenu Villasenor MD    AC%ILE  46%ile 2024 11:29 AM Meenu Villasenor MD    FETAL SURVEY  Incomplete 1/15/2024  9:38 AM Meenu Villasenor MD          Vitals:    24 1108   BP: 110/70   Weight: 74.4 kg (164 lb)     Total weight gain for pregnancy:  12.2 kg (27 lb)    Review of systems:   Gen: negative  CV:     negative  GI: negative  :   negative and good fetal movement noted   MS:    negative  Neuro: negative  Pul: negative    A/P  1. Intrauterine pregnancy at 37w3d   2. Pregnancy Risk:  HIGH RISK    Diagnoses and all orders for this visit:    1. Prenatal care, subsequent pregnancy in third trimester (Primary)    2. Screening for blood or protein in urine  -     POC Urinalysis Dipstick    3. Cervical cerclage suture present in third trimester    4. History of placenta abruption    5. Previous  section    6. Short cervix affecting pregnancy    7. Uterine size date discrepancy pregnancy    8. Maternal anemia in pregnancy, antepartum        Routine labor warnings were discussed and indications for L & D f/u including bleeding, regular contractions, decreased fetal movement or/and rupture of membranes.   -----------------------  PLAN:   Return in about 1 week (around 2024), or ob check and BPP.  Cerclage removed without difficulty  Normal fetal growth today  BPP 8 out of 8  Labor warnings and fetal kick counts given    Meenu Villasenor MD  2024 13:00 EDT

## 2024-05-20 ENCOUNTER — ROUTINE PRENATAL (OUTPATIENT)
Dept: OBSTETRICS AND GYNECOLOGY | Age: 34
End: 2024-05-20
Payer: COMMERCIAL

## 2024-05-20 VITALS — WEIGHT: 167 LBS | BODY MASS INDEX: 30.54 KG/M2 | SYSTOLIC BLOOD PRESSURE: 104 MMHG | DIASTOLIC BLOOD PRESSURE: 70 MMHG

## 2024-05-20 DIAGNOSIS — Z13.89 SCREENING FOR BLOOD OR PROTEIN IN URINE: ICD-10-CM

## 2024-05-20 DIAGNOSIS — O99.019 MATERNAL ANEMIA IN PREGNANCY, ANTEPARTUM: ICD-10-CM

## 2024-05-20 DIAGNOSIS — Z34.83 PRENATAL CARE, SUBSEQUENT PREGNANCY IN THIRD TRIMESTER: Primary | ICD-10-CM

## 2024-05-20 DIAGNOSIS — O26.849 UTERINE SIZE DATE DISCREPANCY PREGNANCY: ICD-10-CM

## 2024-05-20 DIAGNOSIS — O36.5939 SGA (SMALL FOR GESTATIONAL AGE), FETAL, AFFECTING CARE OF MOTHER, ANTEPARTUM, THIRD TRIMESTER, OTHER FETUS: ICD-10-CM

## 2024-05-20 DIAGNOSIS — O26.879 SHORT CERVIX AFFECTING PREGNANCY: ICD-10-CM

## 2024-05-20 DIAGNOSIS — Z98.891 PREVIOUS CESAREAN SECTION: ICD-10-CM

## 2024-05-20 DIAGNOSIS — Z87.59 HISTORY OF PLACENTA ABRUPTION: ICD-10-CM

## 2024-05-20 LAB
BILIRUB BLD-MCNC: NEGATIVE MG/DL
GLUCOSE UR STRIP-MCNC: NEGATIVE MG/DL
KETONES UR QL: NEGATIVE
LEUKOCYTE EST, POC: ABNORMAL
NITRITE UR-MCNC: NEGATIVE MG/ML
PH UR: 6 [PH] (ref 5–8)
PROT UR STRIP-MCNC: NEGATIVE MG/DL
RBC # UR STRIP: NEGATIVE /UL
SP GR UR: 1.02 (ref 1–1.03)
UROBILINOGEN UR QL: NORMAL

## 2024-05-20 PROCEDURE — 0502F SUBSEQUENT PRENATAL CARE: CPT | Performed by: OBSTETRICS & GYNECOLOGY

## 2024-05-20 NOTE — PROGRESS NOTES
Chief Complaint   Patient presents with    Routine Prenatal Visit     Cc:  ob check with BPP  Us , GBS neg  , reports good FM , doing well today no ob complaints does not want cervical check , ready to have a baby on Friday        HPI: 33 y.o.  at 38w3d presents for prenatal care      Last OB US growth (since 2024)         Value Time User    EFW%ILE  37%ile 2024 11:29 AM Meenu Villasenor MD    AC%ILE  46%ile 2024 11:29 AM Meenu Villasenor MD    FETAL SURVEY  Incomplete 1/15/2024  9:38 AM Meenu Villasenor MD          Vitals:    24 1205   BP: 104/70   Weight: 74.8 kg (165 lb)     Total weight gain for pregnancy:  12.7 kg (28 lb)    Review of systems:   Gen: negative  CV:     negative  GI: negative  :   negative and good fetal movement noted   MS:    negative  Neuro: negative  Pul: negative    Physical Exam  Constitutional:       Appearance: Normal appearance.   Pulmonary:      Effort: Pulmonary effort is normal.   Neurological:      Mental Status: She is alert.   Psychiatric:         Mood and Affect: Mood normal.         Thought Content: Thought content normal.         Judgment: Judgment normal.           A/P  1. Intrauterine pregnancy at 38w3d   2. Pregnancy Risk:  HIGH RISK    Diagnoses and all orders for this visit:    1. Prenatal care, subsequent pregnancy in third trimester (Primary)    2. Screening for blood or protein in urine  -     Cancel: POC Urinalysis Dipstick  -     POC Urinalysis Dipstick    3. SGA (small for gestational age), fetal, affecting care of mother, antepartum, third trimester, other fetus    4. Maternal anemia in pregnancy, antepartum    5. Uterine size date discrepancy pregnancy    6. Short cervix affecting pregnancy    7. Previous  section    8. History of placenta abruption         labor was discussed.  Warnings were provided.  Nutrition and weight gain were addressed.  -----------------------  PLAN:   Return in about 3 weeks (around  6/10/2024), or PP incision check.  SGA - BPP today 8/8   Cerclage removed last appt   RLTCS this Friday   PTL warnings     Meenu Villasenor MD  5/20/2024 12:13 EDT

## 2024-05-24 ENCOUNTER — ANESTHESIA (OUTPATIENT)
Dept: LABOR AND DELIVERY | Facility: HOSPITAL | Age: 34
End: 2024-05-24
Payer: COMMERCIAL

## 2024-05-24 ENCOUNTER — HOSPITAL ENCOUNTER (INPATIENT)
Facility: HOSPITAL | Age: 34
LOS: 3 days | Discharge: HOME | End: 2024-05-27
Attending: OBSTETRICS & GYNECOLOGY | Admitting: OBSTETRICS & GYNECOLOGY
Payer: COMMERCIAL

## 2024-05-24 ENCOUNTER — ANESTHESIA EVENT (OUTPATIENT)
Dept: LABOR AND DELIVERY | Facility: HOSPITAL | Age: 34
End: 2024-05-24
Payer: COMMERCIAL

## 2024-05-24 ENCOUNTER — TELEPHONE (OUTPATIENT)
Dept: OBSTETRICS AND GYNECOLOGY | Age: 34
End: 2024-05-24

## 2024-05-24 DIAGNOSIS — Z98.891 PREVIOUS CESAREAN SECTION: ICD-10-CM

## 2024-05-24 PROBLEM — L91.0 KELOID SCAR OF SKIN: Status: ACTIVE | Noted: 2024-05-24

## 2024-05-24 PROBLEM — O26.849 UTERINE SIZE DATE DISCREPANCY PREGNANCY: Status: RESOLVED | Noted: 2024-03-06 | Resolved: 2024-05-24

## 2024-05-24 PROBLEM — O34.219 PREVIOUS CESAREAN SECTION COMPLICATING PREGNANCY: Status: ACTIVE | Noted: 2024-05-24

## 2024-05-24 LAB
ABO GROUP BLD: NORMAL
ALBUMIN SERPL-MCNC: 3.4 G/DL (ref 3.5–5.2)
ALBUMIN/GLOB SERPL: 1.3 G/DL
ALP SERPL-CCNC: 113 U/L (ref 39–117)
ALT SERPL W P-5'-P-CCNC: 9 U/L (ref 1–33)
ANION GAP SERPL CALCULATED.3IONS-SCNC: 11.6 MMOL/L (ref 5–15)
AST SERPL-CCNC: 15 U/L (ref 1–32)
BASOPHILS # BLD AUTO: 0.03 10*3/MM3 (ref 0–0.2)
BASOPHILS NFR BLD AUTO: 0.4 % (ref 0–1.5)
BILIRUB SERPL-MCNC: <0.2 MG/DL (ref 0–1.2)
BLD GP AB SCN SERPL QL: NEGATIVE
BUN SERPL-MCNC: 8 MG/DL (ref 6–20)
BUN/CREAT SERPL: 18.2 (ref 7–25)
CALCIUM SPEC-SCNC: 8.8 MG/DL (ref 8.6–10.5)
CHLORIDE SERPL-SCNC: 103 MMOL/L (ref 98–107)
CO2 SERPL-SCNC: 19.4 MMOL/L (ref 22–29)
CREAT SERPL-MCNC: 0.44 MG/DL (ref 0.57–1)
CREAT UR-MCNC: 29.6 MG/DL
DEPRECATED RDW RBC AUTO: 46.6 FL (ref 37–54)
EGFRCR SERPLBLD CKD-EPI 2021: 132 ML/MIN/1.73
EOSINOPHIL # BLD AUTO: 0.14 10*3/MM3 (ref 0–0.4)
EOSINOPHIL NFR BLD AUTO: 1.7 % (ref 0.3–6.2)
ERYTHROCYTE [DISTWIDTH] IN BLOOD BY AUTOMATED COUNT: 13.1 % (ref 12.3–15.4)
GLOBULIN UR ELPH-MCNC: 2.6 GM/DL
GLUCOSE SERPL-MCNC: 91 MG/DL (ref 65–99)
HCT VFR BLD AUTO: 37.7 % (ref 34–46.6)
HGB BLD-MCNC: 12.5 G/DL (ref 12–15.9)
IMM GRANULOCYTES # BLD AUTO: 0.04 10*3/MM3 (ref 0–0.05)
IMM GRANULOCYTES NFR BLD AUTO: 0.5 % (ref 0–0.5)
LYMPHOCYTES # BLD AUTO: 1.88 10*3/MM3 (ref 0.7–3.1)
LYMPHOCYTES NFR BLD AUTO: 23 % (ref 19.6–45.3)
MCH RBC QN AUTO: 32.1 PG (ref 26.6–33)
MCHC RBC AUTO-ENTMCNC: 33.2 G/DL (ref 31.5–35.7)
MCV RBC AUTO: 96.9 FL (ref 79–97)
MONOCYTES # BLD AUTO: 0.48 10*3/MM3 (ref 0.1–0.9)
MONOCYTES NFR BLD AUTO: 5.9 % (ref 5–12)
NEUTROPHILS NFR BLD AUTO: 5.61 10*3/MM3 (ref 1.7–7)
NEUTROPHILS NFR BLD AUTO: 68.5 % (ref 42.7–76)
NRBC BLD AUTO-RTO: 0 /100 WBC (ref 0–0.2)
PLATELET # BLD AUTO: 150 10*3/MM3 (ref 140–450)
PMV BLD AUTO: 10 FL (ref 6–12)
POTASSIUM SERPL-SCNC: 4.2 MMOL/L (ref 3.5–5.2)
PROT ?TM UR-MCNC: 4 MG/DL
PROT SERPL-MCNC: 6 G/DL (ref 6–8.5)
PROT/CREAT UR: 135.1 MG/G CREA (ref 0–200)
RBC # BLD AUTO: 3.89 10*6/MM3 (ref 3.77–5.28)
RH BLD: POSITIVE
SODIUM SERPL-SCNC: 134 MMOL/L (ref 136–145)
T PALLIDUM IGG SER QL: NORMAL
T&S EXPIRATION DATE: NORMAL
WBC NRBC COR # BLD AUTO: 8.18 10*3/MM3 (ref 3.4–10.8)

## 2024-05-24 PROCEDURE — 25010000002 TRIAMCINOLONE PER 10 MG: Performed by: OBSTETRICS & GYNECOLOGY

## 2024-05-24 PROCEDURE — 84156 ASSAY OF PROTEIN URINE: CPT | Performed by: OBSTETRICS & GYNECOLOGY

## 2024-05-24 PROCEDURE — 63710000001 ONDANSETRON ODT 4 MG TABLET DISPERSIBLE: Performed by: OBSTETRICS & GYNECOLOGY

## 2024-05-24 PROCEDURE — 86780 TREPONEMA PALLIDUM: CPT | Performed by: OBSTETRICS & GYNECOLOGY

## 2024-05-24 PROCEDURE — 25010000002 BUPIVACAINE (PF) 0.5 % SOLUTION

## 2024-05-24 PROCEDURE — 85025 COMPLETE CBC W/AUTO DIFF WBC: CPT | Performed by: OBSTETRICS & GYNECOLOGY

## 2024-05-24 PROCEDURE — 25810000003 LACTATED RINGERS PER 1000 ML: Performed by: OBSTETRICS & GYNECOLOGY

## 2024-05-24 PROCEDURE — 25010000002 KETOROLAC TROMETHAMINE PER 15 MG

## 2024-05-24 PROCEDURE — 86850 RBC ANTIBODY SCREEN: CPT | Performed by: OBSTETRICS & GYNECOLOGY

## 2024-05-24 PROCEDURE — 25010000002 CEFAZOLIN PER 500 MG: Performed by: OBSTETRICS & GYNECOLOGY

## 2024-05-24 PROCEDURE — 25810000003 LACTATED RINGERS PER 1000 ML

## 2024-05-24 PROCEDURE — 25010000002 KETOROLAC TROMETHAMINE PER 15 MG: Performed by: OBSTETRICS & GYNECOLOGY

## 2024-05-24 PROCEDURE — 80053 COMPREHEN METABOLIC PANEL: CPT | Performed by: OBSTETRICS & GYNECOLOGY

## 2024-05-24 PROCEDURE — 25010000002 ONDANSETRON PER 1 MG: Performed by: ANESTHESIOLOGY

## 2024-05-24 PROCEDURE — 25010000002 MORPHINE PER 10 MG

## 2024-05-24 PROCEDURE — 86900 BLOOD TYPING SEROLOGIC ABO: CPT | Performed by: OBSTETRICS & GYNECOLOGY

## 2024-05-24 PROCEDURE — 82570 ASSAY OF URINE CREATININE: CPT | Performed by: OBSTETRICS & GYNECOLOGY

## 2024-05-24 PROCEDURE — 86901 BLOOD TYPING SEROLOGIC RH(D): CPT | Performed by: OBSTETRICS & GYNECOLOGY

## 2024-05-24 PROCEDURE — 25010000002 FENTANYL CITRATE (PF) 100 MCG/2ML SOLUTION

## 2024-05-24 PROCEDURE — 59510 CESAREAN DELIVERY: CPT | Performed by: OBSTETRICS & GYNECOLOGY

## 2024-05-24 RX ORDER — MORPHINE SULFATE 2 MG/ML
2 INJECTION, SOLUTION INTRAMUSCULAR; INTRAVENOUS
Status: ACTIVE | OUTPATIENT
Start: 2024-05-24 | End: 2024-05-24

## 2024-05-24 RX ORDER — CARBOPROST TROMETHAMINE 250 UG/ML
250 INJECTION, SOLUTION INTRAMUSCULAR
Status: DISCONTINUED | OUTPATIENT
Start: 2024-05-24 | End: 2024-05-24

## 2024-05-24 RX ORDER — OXYTOCIN/0.9 % SODIUM CHLORIDE 30/500 ML
999 PLASTIC BAG, INJECTION (ML) INTRAVENOUS ONCE
Status: COMPLETED | OUTPATIENT
Start: 2024-05-24 | End: 2024-05-24

## 2024-05-24 RX ORDER — SODIUM CHLORIDE, SODIUM LACTATE, POTASSIUM CHLORIDE, CALCIUM CHLORIDE 600; 310; 30; 20 MG/100ML; MG/100ML; MG/100ML; MG/100ML
INJECTION, SOLUTION INTRAVENOUS CONTINUOUS PRN
Status: DISCONTINUED | OUTPATIENT
Start: 2024-05-24 | End: 2024-05-24 | Stop reason: SURG

## 2024-05-24 RX ORDER — SODIUM CHLORIDE 9 MG/ML
40 INJECTION, SOLUTION INTRAVENOUS AS NEEDED
Status: DISCONTINUED | OUTPATIENT
Start: 2024-05-24 | End: 2024-05-24

## 2024-05-24 RX ORDER — OXYCODONE HYDROCHLORIDE 10 MG/1
10 TABLET ORAL EVERY 4 HOURS PRN
Status: DISCONTINUED | OUTPATIENT
Start: 2024-05-24 | End: 2024-05-27 | Stop reason: HOSPADM

## 2024-05-24 RX ORDER — SIMETHICONE 80 MG
80 TABLET,CHEWABLE ORAL 4 TIMES DAILY PRN
Status: DISCONTINUED | OUTPATIENT
Start: 2024-05-24 | End: 2024-05-27 | Stop reason: HOSPADM

## 2024-05-24 RX ORDER — TRIAMCINOLONE ACETONIDE 40 MG/ML
280 INJECTION, SUSPENSION INTRA-ARTICULAR; INTRAMUSCULAR ONCE
Status: DISCONTINUED | OUTPATIENT
Start: 2024-05-24 | End: 2024-05-24

## 2024-05-24 RX ORDER — OXYTOCIN/0.9 % SODIUM CHLORIDE 30/500 ML
125 PLASTIC BAG, INJECTION (ML) INTRAVENOUS ONCE AS NEEDED
Status: COMPLETED | OUTPATIENT
Start: 2024-05-24 | End: 2024-05-24

## 2024-05-24 RX ORDER — OXYTOCIN/0.9 % SODIUM CHLORIDE 30/500 ML
250 PLASTIC BAG, INJECTION (ML) INTRAVENOUS CONTINUOUS
Status: DISPENSED | OUTPATIENT
Start: 2024-05-24 | End: 2024-05-24

## 2024-05-24 RX ORDER — IBUPROFEN 600 MG/1
600 TABLET ORAL EVERY 6 HOURS
Status: DISCONTINUED | OUTPATIENT
Start: 2024-05-25 | End: 2024-05-27 | Stop reason: HOSPADM

## 2024-05-24 RX ORDER — HYDROMORPHONE HYDROCHLORIDE 1 MG/ML
0.5 INJECTION, SOLUTION INTRAMUSCULAR; INTRAVENOUS; SUBCUTANEOUS
Status: DISCONTINUED | OUTPATIENT
Start: 2024-05-24 | End: 2024-05-24 | Stop reason: HOSPADM

## 2024-05-24 RX ORDER — ACETAMINOPHEN 325 MG/1
650 TABLET ORAL EVERY 6 HOURS
Status: DISCONTINUED | OUTPATIENT
Start: 2024-05-25 | End: 2024-05-27 | Stop reason: HOSPADM

## 2024-05-24 RX ORDER — SODIUM CHLORIDE 0.9 % (FLUSH) 0.9 %
10 SYRINGE (ML) INJECTION EVERY 12 HOURS SCHEDULED
Status: DISCONTINUED | OUTPATIENT
Start: 2024-05-24 | End: 2024-05-24

## 2024-05-24 RX ORDER — ONDANSETRON 2 MG/ML
4 INJECTION INTRAMUSCULAR; INTRAVENOUS ONCE AS NEEDED
Status: COMPLETED | OUTPATIENT
Start: 2024-05-24 | End: 2024-05-24

## 2024-05-24 RX ORDER — FENTANYL CITRATE 50 UG/ML
INJECTION, SOLUTION INTRAMUSCULAR; INTRAVENOUS AS NEEDED
Status: DISCONTINUED | OUTPATIENT
Start: 2024-05-24 | End: 2024-05-24 | Stop reason: SURG

## 2024-05-24 RX ORDER — DIPHENHYDRAMINE HCL 25 MG
25 CAPSULE ORAL EVERY 4 HOURS PRN
Status: DISCONTINUED | OUTPATIENT
Start: 2024-05-24 | End: 2024-05-27 | Stop reason: HOSPADM

## 2024-05-24 RX ORDER — MISOPROSTOL 200 UG/1
800 TABLET ORAL ONCE AS NEEDED
Status: DISCONTINUED | OUTPATIENT
Start: 2024-05-24 | End: 2024-05-24

## 2024-05-24 RX ORDER — TRANEXAMIC ACID 10 MG/ML
1000 INJECTION, SOLUTION INTRAVENOUS ONCE AS NEEDED
Status: DISCONTINUED | OUTPATIENT
Start: 2024-05-24 | End: 2024-05-24

## 2024-05-24 RX ORDER — MORPHINE SULFATE 1 MG/ML
INJECTION, SOLUTION EPIDURAL; INTRATHECAL; INTRAVENOUS AS NEEDED
Status: DISCONTINUED | OUTPATIENT
Start: 2024-05-24 | End: 2024-05-24 | Stop reason: SURG

## 2024-05-24 RX ORDER — AMOXICILLIN 250 MG
1 CAPSULE ORAL 2 TIMES DAILY
Status: DISCONTINUED | OUTPATIENT
Start: 2024-05-24 | End: 2024-05-27 | Stop reason: HOSPADM

## 2024-05-24 RX ORDER — OXYTOCIN/0.9 % SODIUM CHLORIDE 30/500 ML
PLASTIC BAG, INJECTION (ML) INTRAVENOUS
Status: COMPLETED
Start: 2024-05-24 | End: 2024-05-24

## 2024-05-24 RX ORDER — NALOXONE HCL 0.4 MG/ML
0.2 VIAL (ML) INJECTION
Status: DISCONTINUED | OUTPATIENT
Start: 2024-05-24 | End: 2024-05-27 | Stop reason: HOSPADM

## 2024-05-24 RX ORDER — METHYLERGONOVINE MALEATE 0.2 MG/ML
200 INJECTION INTRAVENOUS ONCE AS NEEDED
Status: DISCONTINUED | OUTPATIENT
Start: 2024-05-24 | End: 2024-05-24

## 2024-05-24 RX ORDER — SODIUM CHLORIDE, SODIUM LACTATE, POTASSIUM CHLORIDE, CALCIUM CHLORIDE 600; 310; 30; 20 MG/100ML; MG/100ML; MG/100ML; MG/100ML
125 INJECTION, SOLUTION INTRAVENOUS CONTINUOUS
Status: DISCONTINUED | OUTPATIENT
Start: 2024-05-24 | End: 2024-05-24

## 2024-05-24 RX ORDER — ONDANSETRON 2 MG/ML
4 INJECTION INTRAMUSCULAR; INTRAVENOUS EVERY 6 HOURS PRN
Status: DISCONTINUED | OUTPATIENT
Start: 2024-05-24 | End: 2024-05-27 | Stop reason: HOSPADM

## 2024-05-24 RX ORDER — ONDANSETRON 4 MG/1
4 TABLET, ORALLY DISINTEGRATING ORAL EVERY 8 HOURS PRN
Status: DISCONTINUED | OUTPATIENT
Start: 2024-05-24 | End: 2024-05-27 | Stop reason: HOSPADM

## 2024-05-24 RX ORDER — TRIAMCINOLONE ACETONIDE 40 MG/ML
200 INJECTION, SUSPENSION INTRA-ARTICULAR; INTRAMUSCULAR ONCE
Status: COMPLETED | OUTPATIENT
Start: 2024-05-24 | End: 2024-05-24

## 2024-05-24 RX ORDER — ONDANSETRON 2 MG/ML
4 INJECTION INTRAMUSCULAR; INTRAVENOUS ONCE AS NEEDED
Status: DISCONTINUED | OUTPATIENT
Start: 2024-05-24 | End: 2024-05-27 | Stop reason: HOSPADM

## 2024-05-24 RX ORDER — DIPHENHYDRAMINE HYDROCHLORIDE 50 MG/ML
25 INJECTION INTRAMUSCULAR; INTRAVENOUS EVERY 4 HOURS PRN
Status: DISCONTINUED | OUTPATIENT
Start: 2024-05-24 | End: 2024-05-27 | Stop reason: HOSPADM

## 2024-05-24 RX ORDER — ACETAMINOPHEN 500 MG
1000 TABLET ORAL EVERY 6 HOURS
Status: DISPENSED | OUTPATIENT
Start: 2024-05-24 | End: 2024-05-25

## 2024-05-24 RX ORDER — OXYCODONE HYDROCHLORIDE 5 MG/1
5 TABLET ORAL EVERY 4 HOURS PRN
Status: DISCONTINUED | OUTPATIENT
Start: 2024-05-24 | End: 2024-05-27 | Stop reason: HOSPADM

## 2024-05-24 RX ORDER — BUPIVACAINE HYDROCHLORIDE 5 MG/ML
INJECTION, SOLUTION EPIDURAL; INTRACAUDAL AS NEEDED
Status: DISCONTINUED | OUTPATIENT
Start: 2024-05-24 | End: 2024-05-24 | Stop reason: SURG

## 2024-05-24 RX ORDER — SODIUM CHLORIDE 0.9 % (FLUSH) 0.9 %
10 SYRINGE (ML) INJECTION AS NEEDED
Status: DISCONTINUED | OUTPATIENT
Start: 2024-05-24 | End: 2024-05-24

## 2024-05-24 RX ORDER — METHYLERGONOVINE MALEATE 0.2 MG/ML
INJECTION INTRAVENOUS
Status: DISCONTINUED
Start: 2024-05-24 | End: 2024-05-24 | Stop reason: WASHOUT

## 2024-05-24 RX ORDER — DROPERIDOL 2.5 MG/ML
0.62 INJECTION, SOLUTION INTRAMUSCULAR; INTRAVENOUS
Status: DISCONTINUED | OUTPATIENT
Start: 2024-05-24 | End: 2024-05-27 | Stop reason: HOSPADM

## 2024-05-24 RX ORDER — FAMOTIDINE 10 MG/ML
20 INJECTION, SOLUTION INTRAVENOUS ONCE AS NEEDED
Status: COMPLETED | OUTPATIENT
Start: 2024-05-24 | End: 2024-05-24

## 2024-05-24 RX ORDER — CITRIC ACID/SODIUM CITRATE 334-500MG
30 SOLUTION, ORAL ORAL ONCE
Status: COMPLETED | OUTPATIENT
Start: 2024-05-24 | End: 2024-05-24

## 2024-05-24 RX ORDER — LIDOCAINE HYDROCHLORIDE 10 MG/ML
0.5 INJECTION, SOLUTION INFILTRATION; PERINEURAL ONCE AS NEEDED
Status: DISCONTINUED | OUTPATIENT
Start: 2024-05-24 | End: 2024-05-24

## 2024-05-24 RX ORDER — KETOROLAC TROMETHAMINE 30 MG/ML
30 INJECTION, SOLUTION INTRAMUSCULAR; INTRAVENOUS ONCE
Status: DISCONTINUED | OUTPATIENT
Start: 2024-05-24 | End: 2024-05-24

## 2024-05-24 RX ORDER — PROMETHAZINE HYDROCHLORIDE 12.5 MG/1
12.5 TABLET ORAL EVERY 4 HOURS PRN
Status: DISCONTINUED | OUTPATIENT
Start: 2024-05-24 | End: 2024-05-27 | Stop reason: HOSPADM

## 2024-05-24 RX ORDER — KETOROLAC TROMETHAMINE 30 MG/ML
INJECTION, SOLUTION INTRAMUSCULAR; INTRAVENOUS AS NEEDED
Status: DISCONTINUED | OUTPATIENT
Start: 2024-05-24 | End: 2024-05-24 | Stop reason: SURG

## 2024-05-24 RX ORDER — KETOROLAC TROMETHAMINE 15 MG/ML
15 INJECTION, SOLUTION INTRAMUSCULAR; INTRAVENOUS EVERY 6 HOURS
Status: COMPLETED | OUTPATIENT
Start: 2024-05-24 | End: 2024-05-25

## 2024-05-24 RX ORDER — ACETAMINOPHEN 500 MG
1000 TABLET ORAL ONCE
Status: DISCONTINUED | OUTPATIENT
Start: 2024-05-24 | End: 2024-05-24

## 2024-05-24 RX ADMIN — ONDANSETRON 4 MG: 4 TABLET, ORALLY DISINTEGRATING ORAL at 14:05

## 2024-05-24 RX ADMIN — FENTANYL CITRATE 20 MCG: 50 INJECTION, SOLUTION INTRAMUSCULAR; INTRAVENOUS at 07:47

## 2024-05-24 RX ADMIN — SODIUM CHLORIDE 2000 MG: 9 INJECTION, SOLUTION INTRAVENOUS at 07:32

## 2024-05-24 RX ADMIN — KETOROLAC TROMETHAMINE 30 MG: 30 INJECTION, SOLUTION INTRAMUSCULAR at 08:35

## 2024-05-24 RX ADMIN — MORPHINE SULFATE 150 MCG: 1 INJECTION, SOLUTION EPIDURAL; INTRATHECAL; INTRAVENOUS at 07:47

## 2024-05-24 RX ADMIN — KETOROLAC TROMETHAMINE 15 MG: 15 INJECTION, SOLUTION INTRAMUSCULAR; INTRAVENOUS at 14:00

## 2024-05-24 RX ADMIN — SODIUM CHLORIDE, POTASSIUM CHLORIDE, SODIUM LACTATE AND CALCIUM CHLORIDE: 600; 310; 30; 20 INJECTION, SOLUTION INTRAVENOUS at 07:47

## 2024-05-24 RX ADMIN — ACETAMINOPHEN 1000 MG: 500 TABLET ORAL at 17:58

## 2024-05-24 RX ADMIN — Medication 999 ML/HR: at 08:08

## 2024-05-24 RX ADMIN — KETOROLAC TROMETHAMINE 15 MG: 15 INJECTION, SOLUTION INTRAMUSCULAR; INTRAVENOUS at 20:34

## 2024-05-24 RX ADMIN — SODIUM CITRATE AND CITRIC ACID MONOHYDRATE 30 ML: 334; 500 SOLUTION ORAL at 07:20

## 2024-05-24 RX ADMIN — SODIUM CHLORIDE, POTASSIUM CHLORIDE, SODIUM LACTATE AND CALCIUM CHLORIDE 125 ML/HR: 600; 310; 30; 20 INJECTION, SOLUTION INTRAVENOUS at 05:35

## 2024-05-24 RX ADMIN — BUPIVACAINE HYDROCHLORIDE 1.6 ML: 5 INJECTION, SOLUTION EPIDURAL; INTRACAUDAL; PERINEURAL at 07:47

## 2024-05-24 RX ADMIN — ONDANSETRON 4 MG: 2 INJECTION INTRAMUSCULAR; INTRAVENOUS at 07:21

## 2024-05-24 RX ADMIN — Medication 125 ML/HR: at 09:45

## 2024-05-24 RX ADMIN — OXYCODONE HYDROCHLORIDE 5 MG: 5 TABLET ORAL at 17:58

## 2024-05-24 RX ADMIN — TRIAMCINOLONE ACETONIDE 200 MG: 40 INJECTION, SUSPENSION INTRA-ARTICULAR; INTRAMUSCULAR at 08:33

## 2024-05-24 RX ADMIN — FAMOTIDINE 20 MG: 10 INJECTION INTRAVENOUS at 07:21

## 2024-05-24 RX ADMIN — SENNOSIDES AND DOCUSATE SODIUM 1 TABLET: 50; 8.6 TABLET ORAL at 20:34

## 2024-05-24 NOTE — ANESTHESIA PROCEDURE NOTES
Intrathecal Block      Patient reassessed immediately prior to procedure    Patient location during procedure: OB  Start Time: 5/24/2024 7:40 AM  Stop Time: 5/24/2024 7:47 AM  Performed By  Anesthesiologist: Louis Suazo MD CRNA/CAA: Nancy Corral CRNA  Preanesthetic Checklist  Completed: patient identified, site marked, surgical consent, pre-op evaluation, timeout performed, IV checked, risks and benefits discussed and monitors and equipment checked  Intrathecal Block Prep:  Pt Position:sitting  Sterile Tech:cap, mask, sterile barrier and gloves  Prep:chloraprep  Monitoring:blood pressure monitoring, continuous pulse oximetry and EKG  Intrathecal Block Procedure:  Approach:midline  Guidance:landmark technique and palpation technique  Location:L2-L3  Needle Type:Johnathan  Needle Gauge:25 G  Placement of Needle Event: cerebrospinal fluid  Fluid Appearance:clear  Post Assessment:  Patient Tolerance:patient tolerated the procedure well with no apparent complications  Complications:no

## 2024-05-24 NOTE — ANESTHESIA POSTPROCEDURE EVALUATION
"Patient: Marysol Malhotra    Procedure Summary       Date: 24 Room / Location:  [facility] LABOR DELIVERY   [facility] LAB[facility] DELIVERY    Anesthesia Start: 735 Anesthesia Stop: 901    Procedure:  SECTION REPEAT (Abdomen) Diagnosis:       History of placenta abruption      Short cervix affecting pregnancy      Cervical cerclage suture present in second trimester      Previous  section      (History of placenta abruption [Z87.59])      (Short cervix affecting pregnancy [O26.879])      (Cervical cerclage suture present in second trimester [O34.32])      (Previous  section [Z98.891])    Surgeons: Meenu Villasenor MD Provider: Louis Suazo MD    Anesthesia Type: spinal ASA Status: 2            Anesthesia Type: spinal    Vitals  Vitals Value Taken Time   /78 24 0915   Temp 36.3 °C (97.4 °F) 24 0900   Pulse 55 24 0924   Resp 16 24 0915   SpO2 87 % 24   Vitals shown include unfiled device data.        Post Anesthesia Care and Evaluation    Patient location during evaluation: PACU  Level of consciousness: awake  Pain management: adequate    Airway patency: patent  Anesthetic complications: No anesthetic complications  PONV Status: controlled  Cardiovascular status: acceptable  Respiratory status: acceptable  Hydration status: acceptable  Post Neuraxial Block status: No signs or symptoms of PDPH  Comments: /78 (BP Location: Right arm, Patient Position: Lying)   Pulse 62   Temp 36.3 °C (97.4 °F) (Oral)   Resp 16   Ht 157.5 cm (62\")   Wt 76.2 kg (168 lb)   LMP 2023   SpO2 99%   Breastfeeding Yes   BMI 30.73 kg/m²       "

## 2024-05-24 NOTE — ANESTHESIA PREPROCEDURE EVALUATION
Anesthesia Evaluation     Patient summary reviewed and Nursing notes reviewed   NPO Solid Status: > 8 hours  NPO Liquid Status: > 2 hours           Airway   Mallampati: II  TM distance: >3 FB  Neck ROM: full  No difficulty expected  Dental - normal exam     Pulmonary - negative pulmonary ROS and normal exam   Cardiovascular - negative cardio ROS and normal exam  Exercise tolerance: good (4-7 METS)        Neuro/Psych- negative ROS  GI/Hepatic/Renal/Endo - negative ROS     Musculoskeletal (-) negative ROS    Abdominal    Substance History - negative use     OB/GYN    (+) Pregnant        Other                    Anesthesia Plan    ASA 2     spinal     (39w0d  )    Anesthetic plan, risks, benefits, and alternatives have been provided, discussed and informed consent has been obtained with: patient.    CODE STATUS:    Level Of Support Discussed With: Patient  Code Status (Patient has no pulse and is not breathing): CPR (Attempt to Resuscitate)  Medical Interventions (Patient has pulse or is breathing): Full Support

## 2024-05-24 NOTE — TELEPHONE ENCOUNTER
LM to pt to let her know she's scheduled on 6/10 for PP incision check. I told her to call the office back if she cannot make that time.

## 2024-05-24 NOTE — OP NOTE
[facility]     2024    Patient:Marysol Malhotra   MR#:[id number]     Section Procedure Note    Indications: previous  section low transverse    Pre-operative Diagnosis: Intrauterine pregnancy at 39w0d  2.  Previous  section, desires repeat    Post-operative Diagnosis: same    Procedure:    Repeat low transverse  section       Surgeon: Meenu Villasenor MD     Assistants: Porfirio Martin MD    Anesthesia: Spinal anesthesia    Prenatal care problem list:    Patient Active Problem List   Diagnosis    Previous  section    History of placenta abruption    Pregnancy    Short cervix affecting pregnancy    Maternal anemia in pregnancy, antepartum    SGA (small for gestational age), fetal, affecting care of mother, antepartum, third trimester, other fetus    Previous  section complicating pregnancy    Keloid scar of skin    S/P  section       Procedure Details   The patient was seen in the LDR preoperatively. The risks, benefits, complications, treatment options, and expected outcomes were discussed with the patient.  The patient concurred with the proposed plan, giving informed consent.  The site of surgery is discussed. The patient was taken to Operating Room # 1, identified as Marysol Malhotra and the procedure verified as  Delivery. A Time Out was held and the above information confirmed.    After induction of anesthesia, the patient was draped and prepped in the usual sterile manner. A Pfannenstiel incision was made and carried down through the subcutaneous tissue to the fascia.  Care was taken to remove the significant keloid scar at the incision site.  Fascial incision was made and extended transversely. The fascia was  from the underlying rectus tissue superiorly and inferiorly. The peritoneum was identified and entered. Peritoneal incision was extended longitudinally. The utero-vesical peritoneal  reflection was incised transversely and the bladder flap was bluntly freed from the lower uterine segment. A low transverse uterine incision was made. Delivered from vertex presentation was a male  fetus 3620 g (7 lb 15.7 oz)  with Apgar scores of 9   at one minute and 9  at five minutes. After the umbilical cord was clamped and cut cord blood was obtained for evaluation. The placenta was removed intact and appeared normal. The uterine outline, tubes and ovaries appeared normal.  The uterine incision was closed with 2 layers running locked sutures of 0 Monocryl. Hemostasis was observed.     The peritoneum and rectus muscles were reapproximated with 3-0 Monocryl.  Fascia was then reapproximated with running sutures of 0 Vicryl.  The subcutaneous layer was reapproximated with 3-0 Monocryl.  The INSORB device was used for skin closure.  Kenalog was injected into the incision prior to closure.    Instrument, sponge, and needle counts were correct prior the abdominal closure and at the conclusion of the case.     Assistant: Lazarus Martin MD  was responsible for performing the following activities: Retraction, Suction, Irrigation, and Delivery of Fetus and their skilled assistance was necessary for the success of this case.    Findings:  Normal uterus, tubes and ovaries  Significant keloid scar on incision      Quantatative Blood Loss:   See nursing flowsheet.                   Specimens:  Placenta            Complications:  None; patient tolerated the procedure well.           Disposition: PACU - hemodynamically stable.           Condition: stable    Attending Attestation: I was present and scrubbed for the entire procedure.      Meenu Villasenor MD  2024 09:00 EDT

## 2024-05-24 NOTE — H&P
[facility]  Obstetric History and Physical    Chief Complaint   Patient presents with    Scheduled      Repeat .  Denies crampiung, LOF, vaginal bleeding.  Patient reported active FM.  First csection for placental abruption       Subjective     Patient is a 33 y.o. female  currently at 39w0d, who presents for scheduled repeat  section.  Patient denies contractions, loss of fluid, or vaginal bleeding.  Reports good fetal movement.    Her prenatal care is complicated by  cervical incompetence, prior   desires repeat , and abnormal fetal growth  small for gestational age.  Her previous obstetric/gynecological history is noted for is non-contributory.    The following portions of the patients history were reviewed and updated as appropriate: current medications, allergies, past medical history, past surgical history, past family history, past social history, and problem list .       Prenatal Information:  Prenatal Results       Initial Prenatal Labs       Test Value Reference Range Date Time    Hemoglobin  12.1 g/dL 12.0 - 15.9 24 1433       12.2 g/dL 11.1 - 15.9 10/18/23 1144    Hematocrit  35.8 % 34.0 - 46.6 24 1433       36.3 % 34.0 - 46.6 10/18/23 1144    Platelets  143 10*3/mm3 140 - 450 24 1433       217 x10E3/uL 150 - 450 10/18/23 1144    Rubella IgG  2.74 index Immune >0.99 10/18/23 1144    Hepatitis B SAg  Negative  Negative 10/18/23 1144    Hepatitis C Ab  Non Reactive  Non Reactive 10/18/23 1144    RPR  Non Reactive  Non Reactive 24 0905       Non Reactive  Non Reactive 10/18/23 1144    T. Pallidum Ab   Non-Reactive  Non-Reactive 24 0525    ABO  O   24 0525    Rh  Positive   24 0525    Antibody Screen  Negative   24 1433       Negative  Negative 10/18/23 1144    HIV  Non Reactive  Non Reactive 10/18/23 1144    Urine Culture  Final report   10/18/23 1442    Gonorrhea  Negative  Negative 10/18/23 1641     Chlamydia  Negative  Negative 10/18/23 1641    TSH  0.500 uIU/mL 0.450 - 4.500 10/18/23 1144    HgB A1c   5.0 % 4.8 - 5.6 10/18/23 1144    Varicella IgG  1,805 index Immune >165 10/18/23 1144    HgB Electrophoresis         Cystic fibrosis                   Fetal testing        Test Value Reference Range Date Time    NIPT        MSAFP        AFP-4                  2nd and 3rd Trimester       Test Value Reference Range Date Time    Hemoglobin (repeated)  12.5 g/dL 12.0 - 15.9 05/24/24 0525       11.0 g/dL 11.1 - 15.9 03/06/24 0905    Hematocrit (repeated)  37.7 % 34.0 - 46.6 05/24/24 0525       32.6 % 34.0 - 46.6 03/06/24 0905    Platelets   150 10*3/mm3 140 - 450 05/24/24 0525       173 x10E3/uL 150 - 450 03/06/24 0905       143 10*3/mm3 140 - 450 01/18/24 1433       217 x10E3/uL 150 - 450 10/18/23 1144    GCT  123 mg/dL 70 - 139 03/06/24 0905    Antibody Screen (repeated)  Negative   05/24/24 0525    3rd TM syphilis scrn (repeated)  RPR   Non Reactive  Non Reactive 03/06/24 0905    3rd TM syphilis scrn (repeated) FTA        GTT Fasting        GTT 1 Hr        GTT 2 Hr        GTT 3 Hr        Group B Strep  Negative  Negative 04/29/24 1100              Other testing        Test Value Reference Range Date Time    Parvo IgG         CMV IgG                   Drug Screening       Test Value Reference Range Date Time    Amphetamine Screen        Barbiturate Screen        Benzodiazepine Screen        Methadone Screen        Phencyclidine Screen        Opiates Screen        THC Screen        Cocaine Screen        Propoxyphene Screen        Buprenorphine Screen        Methamphetamine Screen        Oxycodone Screen        Tricyclic Antidepressants Screen                  Legend    ^: Historical                          External Prenatal Results       Pregnancy Outside Results - Transcribed From Office Records - See Scanned Records For Details       Test Value Date Time    ABO  O  05/24/24 0525    Rh  Positive  05/24/24 0525     Antibody Screen  Negative  24 0525       Negative  24 1433       Negative  10/18/23 1144    Varicella IgG  1,805 index 10/18/23 1144    Rubella  2.74 index 10/18/23 1144    Hgb  12.5 g/dL 24 0525       11.0 g/dL 24 0905       12.1 g/dL 24 1433       12.2 g/dL 10/18/23 1144    Hct  37.7 % 24 0525       32.6 % 24 0905       35.8 % 24 1433       36.3 % 10/18/23 1144    Glucose Fasting GTT       Glucose Tolerance Test 1 hour       Glucose Tolerance Test 3 hour       Gonorrhea (discrete)  Negative  10/18/23 1641    Chlamydia (discrete)  Negative  10/18/23 1641    RPR  Non Reactive  24 0905       Non Reactive  10/18/23 1144    VDRL       Syphilis Antibody       HBsAg  Negative  10/18/23 1144    Herpes Simplex Virus PCR       Herpes Simplex VIrus Culture       HIV  Non Reactive  10/18/23 1144    Hep C RNA Quant PCR       Hep C Antibody  Non Reactive  10/18/23 1144    AFP       Group B Strep  Negative  24 1100    GBS Susceptibility to Clindamycin       GBS Susceptibility to Erythromycin       Fetal Fibronectin       Genetic Testing, Maternal Blood                 Drug Screening       Test Value Date Time    NIPT        Urine Drug Screen       Amphetamine Screen       Barbiturate Screen       Benzodiazepine Screen       Methadone Screen       Phencyclidine Screen       Opiates Screen       THC Screen       Cocaine Screen       Propoxyphene Screen       Buprenorphine Screen       Methamphetamine Screen       Oxycodone Screen                 Legend    ^: Historical                             Past OB History:     OB History    Para Term  AB Living   2 1 1 0 0 1   SAB IAB Ectopic Molar Multiple Live Births   0 0 0 0 0 1      # Outcome Date GA Lbr Dennis/2nd Weight Sex Type Anes PTL Lv   2 Current            1 Term 10/20/21 37w5d  3220 g (7 lb 1.6 oz) M CS-LTranv EPI N DOLORES      Birth Comments: PROM at 37-5 weeks - c/s for bleeding      Complications: Other  Excessive Bleeding, Abruptio Placenta      Name: Josué       Apgar1: 9  Apgar5: 9      Obstetric Comments   10/18/23 - IUP at 7-5 weeks by L=US - TIFFANY 24 - [facility]    3/6/24 - 27-5 weeks - EFW 42%, AC 53% - F    24 -32-3 weeks - EFW 15%, AC 9 % - F     4/15/24 - 33-4 weeks - EFW 27%, AC 19% - [facility]        Past Medical History: Past Medical History:   Diagnosis Date    Placental abruption     PONV (postoperative nausea and vomiting)       Past Surgical History Past Surgical History:   Procedure Laterality Date    CERVICAL CERCLAGE N/A 2024    Procedure: CERVICAL CERCLAGE;  Surgeon: Zuleyka Lin MD;  Location: [facility] OR;  Service: Obstetrics;  Laterality: N/A;     SECTION N/A 10/20/2021    Procedure:  SECTION PRIMARY;  Surgeon: Meenu Villasenor MD;  Location: [facility] LABOR DELIVERY;  Service: Obstetrics/Gynecology;  Laterality: N/A;    WISDOM TOOTH EXTRACTION        Family History: Family History   Problem Relation Age of Onset    Breast cancer Neg Hx     Ovarian cancer Neg Hx     Uterine cancer Neg Hx     Colon cancer Neg Hx       Social History:  reports that she has never smoked. She has never been exposed to tobacco smoke. She has never used smokeless tobacco.   reports that she does not currently use alcohol.   reports no history of drug use.        Review of Systems   Constitutional:  Negative for chills, fatigue and fever.   Gastrointestinal:  Negative for abdominal distention and abdominal pain.   Genitourinary:  Negative for pelvic pain, vaginal bleeding, vaginal discharge and vaginal pain.   All other systems reviewed and are negative.        Objective     Vital Signs Range for the last 24 hours  Temperature: Temp:  [97.7 °F (36.5 °C)] 97.7 °F (36.5 °C)   Temp Source: Temp src: Oral   BP: BP: (109)/(74) 109/74   Pulse: Heart Rate:  [72] 72   Respirations: Resp:  [16] 16   SPO2: SpO2:  [98 %] 98 %   O2 Amount (l/min):     O2 Devices     Weight: Weight:  [76.2 kg (168  lb)] 76.2 kg (168 lb)     Physical Examination: General appearance - alert, well appearing, and in no distress  Abdomen - gravid, soft, nontender, nondistended, no masses or organomegaly, significant keloid scar at previous incision site  Musculoskeletal - no joint tenderness, deformity or swelling  Extremities - peripheral pulses normal, no pedal edema, no clubbing or cyanosis  Skin - normal coloration and turgor, no rashes, no suspicious skin lesions noted    Presentation: Vertex    Cervix: Exam by:     Dilation:     Effacement:     Station:       Fetal Heart Rate Assessment   Method: Fetal HR Assessment Method: external   Beats/min: Fetal HR (beats/min): 140   Baseline: Fetal HR Baseline: normal range   Variability: Fetal HR Variability: moderate (amplitude range 6 to 25 bpm)   Accels: Fetal HR Accelerations: greater than/equal to 15 bpm, lasting at least 15 seconds   Decels: Fetal HR Decelerations: absent   Tracing Category:       Uterine Assessment   Method: Method: external tocotransducer   Frequency (min): Contraction Frequency (Minutes): 3-13   Ctx Count in 10 min: Contractions in 10 Minutes: 3   Duration:     Intensity: Contraction Intensity: mild by palpation   Intensity by IUPC:     Resting Tone: Uterine Resting Tone: soft by palpation   Resting Tone by IUPC:     Jarratt Units:       Laboratory Results:   Results from last 7 days   Lab Units 24  0525   WBC 10*3/mm3 8.18   HEMOGLOBIN g/dL 12.5   HEMATOCRIT % 37.7   PLATELETS 10*3/mm3 150       Results from last 7 days   Lab Units 24  0525   SODIUM mmol/L 134*   POTASSIUM mmol/L 4.2   CHLORIDE mmol/L 103   CO2 mmol/L 19.4*   BUN mg/dL 8   CREATININE mg/dL 0.44*   CALCIUM mg/dL 8.8   BILIRUBIN mg/dL <0.2   ALK PHOS U/L 113   ALT (SGPT) U/L 9   AST (SGOT) U/L 15   GLUCOSE mg/dL 91         Assessment & Plan       Previous  section complicating pregnancy    Previous  section    History of placenta abruption    Short cervix  affecting pregnancy    Maternal anemia in pregnancy, antepartum    SGA (small for gestational age), fetal, affecting care of mother, antepartum, third trimester, other fetus    Keloid scar of skin      Assessment & Plan    Assessment:  1.  Intrauterine pregnancy at 39w0d gestation with reactive, reassuring fetal status.    2.  Prior  section  3.  Obstetrical history significant for is non-contributory.  4.  GBS status:   Strep Gp B Culture   Date Value Ref Range Status   2024 Negative Negative Final     Comment:     Centers for Disease Control and Prevention (CDC) and American Congress  of Obstetricians and Gynecologists (ACOG) guidelines for prevention of   group B streptococcal (GBS) disease specify co-collection of  a vaginal and rectal swab specimen to maximize sensitivity of GBS  detection. Per the CDC and ACOG, swabbing both the lower vagina and  rectum substantially increases the yield of detection compared with  sampling the vagina alone.  Penicillin G, ampicillin, or cefazolin are indicated for intrapartum  prophylaxis of  GBS colonization. Reflex susceptibility  testing should be performed prior to use of clindamycin only on GBS  isolates from penicillin-allergic women who are considered a high risk  for anaphylaxis. Treatment with vancomycin without additional testing  is warranted if resistance to clindamycin is noted.         Plan:  1. Repeat  scheduled  2. Plan of care has been reviewed with patient and .   3.  Risks, benefits of treatment plan have been discussed.  4.  All questions have been answered.        Meenu Villasenor MD  2024  07:19 EDT

## 2024-05-24 NOTE — PLAN OF CARE
Problem: Adult Inpatient Plan of Care  Goal: Plan of Care Review  Outcome: Ongoing, Progressing  Goal: Patient-Specific Goal (Individualized)  Outcome: Ongoing, Progressing  Goal: Absence of Hospital-Acquired Illness or Injury  Outcome: Ongoing, Progressing  Intervention: Prevent Skin Injury  Recent Flowsheet Documentation  Taken 2024 09 by Robina Razo RN  Body Position:   supine   30 degrees  Intervention: Prevent and Manage VTE (Venous Thromboembolism) Risk  Recent Flowsheet Documentation  Taken 2024 0915 by Robina Razo RN  VTE Prevention/Management:   bilateral   sequential compression devices on  Taken 2024 09 by Robina Razo RN  Activity Management: bedrest  VTE Prevention/Management:   bilateral   sequential compression devices on  Intervention: Prevent Infection  Recent Flowsheet Documentation  Taken 2024 09 by Robina Razo RN  Infection Prevention:   hand hygiene promoted   personal protective equipment utilized   rest/sleep promoted   single patient room provided   equipment surfaces disinfected  Goal: Optimal Comfort and Wellbeing  Outcome: Ongoing, Progressing  Intervention: Provide Person-Centered Care  Recent Flowsheet Documentation  Taken 2024 by Robina Razo RN  Trust Relationship/Rapport:   care explained   choices provided   emotional support provided   empathic listening provided   questions answered   questions encouraged   reassurance provided   thoughts/feelings acknowledged  Goal: Readiness for Transition of Care  Outcome: Ongoing, Progressing     Problem: Bleeding ( Delivery)  Goal: Bleeding is Controlled  Outcome: Ongoing, Progressing     Problem: Change in Fetal Wellbeing ( Delivery)  Goal: Stable Fetal Wellbeing  Outcome: Ongoing, Progressing  Intervention: Promote and Monitor Fetal Wellbeing  Recent Flowsheet Documentation  Taken 2024 09 by Robina Razo RN  Body Position:   supine   30 degrees     Problem: Infection (  Delivery)  Goal: Absence of Infection Signs and Symptoms  Outcome: Ongoing, Progressing  Intervention: Minimize Infection Risk  Recent Flowsheet Documentation  Taken 2024 by Robina Razo RN  Infection Prevention:   hand hygiene promoted   personal protective equipment utilized   rest/sleep promoted   single patient room provided   equipment surfaces disinfected     Problem: Respiratory Compromise ( Delivery)  Goal: Effective Oxygenation and Ventilation  Outcome: Ongoing, Progressing     Problem: Device-Related Complication Risk (Anesthesia/Analgesia, Neuraxial)  Goal: Safe Infusion Delivery Completion  Outcome: Ongoing, Progressing     Problem: Infection (Anesthesia/Analgesia, Neuraxial)  Goal: Absence of Infection Signs and Symptoms  Outcome: Ongoing, Progressing  Intervention: Prevent or Manage Infection  Recent Flowsheet Documentation  Taken 2024 by Robina Razo RN  Infection Prevention:   hand hygiene promoted   personal protective equipment utilized   rest/sleep promoted   single patient room provided   equipment surfaces disinfected     Problem: Nausea and Vomiting (Anesthesia/Analgesia, Neuraxial)  Goal: Nausea and Vomiting Relief  Outcome: Ongoing, Progressing     Problem: Pain (Anesthesia/Analgesia, Neuraxial)  Goal: Effective Pain Control  Outcome: Ongoing, Progressing     Problem: Respiratory Compromise (Anesthesia/Analgesia, Neuraxial)  Goal: Effective Oxygenation and Ventilation  Outcome: Ongoing, Progressing  Intervention: Optimize Oxygenation and Ventilation  Recent Flowsheet Documentation  Taken 2024 by Robina Razo RN  Head of Bed (HOB) Positioning: HOB at 20-30 degrees     Problem: Sensorimotor Impairment (Anesthesia/Analgesia, Neuraxial)  Goal: Baseline Motor Function  Outcome: Ongoing, Progressing     Problem: Urinary Retention (Anesthesia/Analgesia, Neuraxial)  Goal: Effective Urinary Elimination  Outcome: Ongoing, Progressing     Problem:  Fall Injury  Risk  Goal: Absence of Fall, Infant Drop and Related Injury  Outcome: Ongoing, Progressing   Goal Outcome Evaluation:

## 2024-05-25 LAB
BASOPHILS # BLD AUTO: 0.02 10*3/MM3 (ref 0–0.2)
BASOPHILS NFR BLD AUTO: 0.2 % (ref 0–1.5)
DEPRECATED RDW RBC AUTO: 45.1 FL (ref 37–54)
EOSINOPHIL # BLD AUTO: 0.03 10*3/MM3 (ref 0–0.4)
EOSINOPHIL NFR BLD AUTO: 0.2 % (ref 0.3–6.2)
ERYTHROCYTE [DISTWIDTH] IN BLOOD BY AUTOMATED COUNT: 12.7 % (ref 12.3–15.4)
HCT VFR BLD AUTO: 34.1 % (ref 34–46.6)
HGB BLD-MCNC: 11.5 G/DL (ref 12–15.9)
IMM GRANULOCYTES # BLD AUTO: 0.05 10*3/MM3 (ref 0–0.05)
IMM GRANULOCYTES NFR BLD AUTO: 0.4 % (ref 0–0.5)
LYMPHOCYTES # BLD AUTO: 1.17 10*3/MM3 (ref 0.7–3.1)
LYMPHOCYTES NFR BLD AUTO: 9.1 % (ref 19.6–45.3)
MCH RBC QN AUTO: 32.4 PG (ref 26.6–33)
MCHC RBC AUTO-ENTMCNC: 33.7 G/DL (ref 31.5–35.7)
MCV RBC AUTO: 96.1 FL (ref 79–97)
MONOCYTES # BLD AUTO: 0.54 10*3/MM3 (ref 0.1–0.9)
MONOCYTES NFR BLD AUTO: 4.2 % (ref 5–12)
NEUTROPHILS NFR BLD AUTO: 11.01 10*3/MM3 (ref 1.7–7)
NEUTROPHILS NFR BLD AUTO: 85.9 % (ref 42.7–76)
NRBC BLD AUTO-RTO: 0 /100 WBC (ref 0–0.2)
PLATELET # BLD AUTO: 154 10*3/MM3 (ref 140–450)
PMV BLD AUTO: 10.2 FL (ref 6–12)
RBC # BLD AUTO: 3.55 10*6/MM3 (ref 3.77–5.28)
WBC NRBC COR # BLD AUTO: 12.82 10*3/MM3 (ref 3.4–10.8)

## 2024-05-25 PROCEDURE — 25010000002 KETOROLAC TROMETHAMINE PER 15 MG: Performed by: OBSTETRICS & GYNECOLOGY

## 2024-05-25 PROCEDURE — 85025 COMPLETE CBC W/AUTO DIFF WBC: CPT | Performed by: OBSTETRICS & GYNECOLOGY

## 2024-05-25 RX ADMIN — ACETAMINOPHEN 1000 MG: 500 TABLET ORAL at 00:06

## 2024-05-25 RX ADMIN — IBUPROFEN 600 MG: 600 TABLET, FILM COATED ORAL at 14:18

## 2024-05-25 RX ADMIN — KETOROLAC TROMETHAMINE 15 MG: 15 INJECTION, SOLUTION INTRAMUSCULAR; INTRAVENOUS at 08:27

## 2024-05-25 RX ADMIN — IBUPROFEN 600 MG: 600 TABLET, FILM COATED ORAL at 21:21

## 2024-05-25 RX ADMIN — SENNOSIDES AND DOCUSATE SODIUM 1 TABLET: 50; 8.6 TABLET ORAL at 21:21

## 2024-05-25 RX ADMIN — SENNOSIDES AND DOCUSATE SODIUM 1 TABLET: 50; 8.6 TABLET ORAL at 08:27

## 2024-05-25 RX ADMIN — ACETAMINOPHEN 325MG 650 MG: 325 TABLET ORAL at 23:55

## 2024-05-25 RX ADMIN — ACETAMINOPHEN 325MG 650 MG: 325 TABLET ORAL at 10:24

## 2024-05-25 RX ADMIN — ACETAMINOPHEN 1000 MG: 500 TABLET ORAL at 06:17

## 2024-05-25 RX ADMIN — ACETAMINOPHEN 325MG 650 MG: 325 TABLET ORAL at 18:01

## 2024-05-25 RX ADMIN — KETOROLAC TROMETHAMINE 15 MG: 15 INJECTION, SOLUTION INTRAMUSCULAR; INTRAVENOUS at 02:17

## 2024-05-25 NOTE — PLAN OF CARE
Goal Outcome Evaluation:  Plan of Care Reviewed With: patient, spouse        Progress: improving  Outcome Evaluation: vss. ambulating with standby assistance. agee catheter in place, plans to d/c in AM. fundus and lochia wnl. small amount of serosanguienous drainage on incision dressing. pain controlled with eras meds.

## 2024-05-25 NOTE — PROGRESS NOTES
[facility]   Obstetrics and Gynecology     2024    Patient: Marysol Malhotra   MR#:[id number]        Progress note         HD#1  Post-Op Day 1 S/P    Delivered a male infant.    Subjective     Marysol Malhotra is a 33 y.o. female  post operative from CS at 39w0d weeks  Patient reports:  Pain is well controlled. Voiding and ambulating without difficulty.  Tolerating po. Lochia normal.     Breast/bottle The patient is currently breastfeeding.    Patient Active Problem List   Diagnosis    Previous  section    History of placenta abruption    Pregnancy    Short cervix affecting pregnancy    Maternal anemia in pregnancy, antepartum    SGA (small for gestational age), fetal, affecting care of mother, antepartum, third trimester, other fetus    Previous  section complicating pregnancy    Keloid scar of skin    S/P  section        Objective      Vital Signs Range for the last 24 hours    Temperature: Temp:  [96.9 °F (36.1 °C)-98.3 °F (36.8 °C)] 98.1 °F (36.7 °C)  BP:  BP: ()/(51-84) 109/76  Pulse:  Heart Rate:  [53-73] 66  Respirations: Resp:  [16-18] 16  Weight: 76.2 kg (168 lb)   BMI:  Body mass index is 30.73 kg/m².    I/O last 3 completed shifts:  In: 2670.6 [I.V.:2670.6]  Out: 4940 [Urine:4400; Blood:540]  No intake/output data recorded.     Physical Exam  Vitals and nursing note reviewed.   Constitutional:       General: She is not in acute distress.     Appearance: Normal appearance.   Cardiovascular:      Pulses: Normal pulses.   Pulmonary:      Effort: Pulmonary effort is normal.   Abdominal:      General: There is no distension.      Palpations: Abdomen is soft.      Tenderness: There is no abdominal tenderness. There is no guarding or rebound.      Comments: Incision c/d/i   Musculoskeletal:         General: No swelling or tenderness.      Right lower leg: No edema.      Left lower leg: No edema.   Neurological:      Mental Status: She is alert  and oriented to person, place, and time.   Psychiatric:         Mood and Affect: Mood normal.         Behavior: Behavior normal.         LABS:    Results from last 7 days   Lab Units 24  0503 24  0525   WBC 10*3/mm3 12.82* 8.18   HEMOGLOBIN g/dL 11.5* 12.5   HEMATOCRIT % 34.1 37.7   PLATELETS 10*3/mm3 154 150     Results from last 7 days   Lab Units 24  0525   SODIUM mmol/L 134*   POTASSIUM mmol/L 4.2   CHLORIDE mmol/L 103   CO2 mmol/L 19.4*   BUN mg/dL 8   CREATININE mg/dL 0.44*   CALCIUM mg/dL 8.8   BILIRUBIN mg/dL <0.2   ALK PHOS U/L 113   ALT (SGPT) U/L 9   AST (SGOT) U/L 15   GLUCOSE mg/dL 91         Assessment & Plan     1.  POD #1 S/P C/S:  Hemodynamically stable.  Doing well.     Previous  section complicating pregnancy    Previous  section    History of placenta abruption    Short cervix affecting pregnancy    Maternal anemia in pregnancy, antepartum    SGA (small for gestational age), fetal, affecting care of mother, antepartum, third trimester, other fetus    Keloid scar of skin    S/P  section      Plan:    Continue routine postpartum care  Infant circumcision:  Procedure reviewed with the patient including risk, benefits and elective nature of the procedure          Briana Eli MD  2024  09:24 EDT

## 2024-05-25 NOTE — LACTATION NOTE
"This note was copied from a baby's chart.  Per mom, she had submitted a request to [employer] 2 days ago for her pump & was texting earlier this evening with Birgit at [employer].  The hands free pump she wanted was going to cost her several hundred dollars but that she would like to get the Spectra S2 instead since there is no copay with her [place] coverage & she can get it from the hospital.  Explained that [employer] is now closed until Tuesday am for the holiday weekend but that I will fax the Rx & indicate that a Spectra PBP was dispensed to her from the hospital.  She says she exclusively pumped with her first \"because Bf'g hurt & it is hurting again with this baby so she wants to exclusively pump again.\"  Rx faxed & Spectra S2 dispensed.   "

## 2024-05-25 NOTE — LACTATION NOTE
This note was copied from a baby's chart.  Rounded on PT at this time. Reports she continues to pump, but is now only getting drops. Explained that this is normal and encouraged mom to continue pumping every 3h for 15 min. She denies any questions.

## 2024-05-25 NOTE — PLAN OF CARE
Goal Outcome Evaluation:   Vitals, fundus, lochia WNL. Incision dressing removed. Proxi-strip in place, mild drainage - ice applied. Voiding freely. Ambulating independently. Breastfeeding and Bottle feeding. Pain controlled with scheduled medications. Shower taken.

## 2024-05-26 RX ADMIN — ACETAMINOPHEN 325MG 650 MG: 325 TABLET ORAL at 09:00

## 2024-05-26 RX ADMIN — ACETAMINOPHEN 325MG 650 MG: 325 TABLET ORAL at 21:10

## 2024-05-26 RX ADMIN — MUPIROCIN 1 APPLICATION: 20 OINTMENT TOPICAL at 15:07

## 2024-05-26 RX ADMIN — SENNOSIDES AND DOCUSATE SODIUM 1 TABLET: 50; 8.6 TABLET ORAL at 09:00

## 2024-05-26 RX ADMIN — IBUPROFEN 600 MG: 600 TABLET, FILM COATED ORAL at 06:03

## 2024-05-26 RX ADMIN — IBUPROFEN 600 MG: 600 TABLET, FILM COATED ORAL at 18:11

## 2024-05-26 RX ADMIN — ACETAMINOPHEN 325MG 650 MG: 325 TABLET ORAL at 15:07

## 2024-05-26 RX ADMIN — SENNOSIDES AND DOCUSATE SODIUM 1 TABLET: 50; 8.6 TABLET ORAL at 21:11

## 2024-05-26 RX ADMIN — IBUPROFEN 600 MG: 600 TABLET, FILM COATED ORAL at 12:09

## 2024-05-26 NOTE — LACTATION NOTE
This note was copied from a baby's chart.  Reports continues to exclusively pump and supplement with formula. Is pumping every 4-5 hours and getting 20-30 ml each time.  Had an oversupply with last baby and is pumping less often this time to prevent this again.  Nipples have scabs bilaterally from when attempted to latch the first day.  APNO cream ordered and requested.  Encouraged to call for any questions or concerns.  LC number on whiteboard.

## 2024-05-26 NOTE — PLAN OF CARE
Goal Outcome Evaluation:   Vss. Pt resting between care and pumping and bottle feeding. Uterus WNL & bleeding very scant at this time. Pain controlled with ERAS meds & pt stating she would prefer to d/c tomorrow instead of today. All questions answered & no other changes at this time.       Problem: Adult Inpatient Plan of Care  Goal: Plan of Care Review  Outcome: Ongoing, Progressing  Flowsheets (Taken 2024 0417 by Africa Yu RN)  Plan of Care Reviewed With:   patient   spouse  Goal: Patient-Specific Goal (Individualized)  Outcome: Ongoing, Progressing  Goal: Absence of Hospital-Acquired Illness or Injury  Outcome: Ongoing, Progressing  Intervention: Identify and Manage Fall Risk  Recent Flowsheet Documentation  Taken 2024 1016 by Janel Knight RN  Safety Promotion/Fall Prevention: safety round/check completed  Taken 2024 0900 by Janel Knight RN  Safety Promotion/Fall Prevention: safety round/check completed  Intervention: Prevent and Manage VTE (Venous Thromboembolism) Risk  Recent Flowsheet Documentation  Taken 2024 0900 by Janel Knight RN  Activity Management: up ad nikos  Goal: Optimal Comfort and Wellbeing  Outcome: Ongoing, Progressing  Intervention: Monitor Pain and Promote Comfort  Recent Flowsheet Documentation  Taken 2024 0900 by Janel Knight RN  Pain Management Interventions: see MAR  Intervention: Provide Person-Centered Care  Recent Flowsheet Documentation  Taken 2024 0900 by Janel Knight RN  Trust Relationship/Rapport:   care explained   questions answered   thoughts/feelings acknowledged  Goal: Readiness for Transition of Care  Outcome: Ongoing, Progressing     Problem: Bleeding ( Delivery)  Goal: Bleeding is Controlled  Outcome: Ongoing, Progressing     Problem: Change in Fetal Wellbeing ( Delivery)  Goal: Stable Fetal Wellbeing  Outcome: Ongoing, Progressing     Problem: Infection ( Delivery)  Goal: Absence of Infection Signs and  Symptoms  Outcome: Ongoing, Progressing     Problem: Respiratory Compromise ( Delivery)  Goal: Effective Oxygenation and Ventilation  Outcome: Ongoing, Progressing     Problem: Device-Related Complication Risk (Anesthesia/Analgesia, Neuraxial)  Goal: Safe Infusion Delivery Completion  Outcome: Ongoing, Progressing     Problem: Infection (Anesthesia/Analgesia, Neuraxial)  Goal: Absence of Infection Signs and Symptoms  Outcome: Ongoing, Progressing     Problem: Nausea and Vomiting (Anesthesia/Analgesia, Neuraxial)  Goal: Nausea and Vomiting Relief  Outcome: Ongoing, Progressing     Problem: Pain (Anesthesia/Analgesia, Neuraxial)  Goal: Effective Pain Control  Outcome: Ongoing, Progressing  Intervention: Prevent or Manage Pain  Recent Flowsheet Documentation  Taken 2024 0900 by Janel Knight RN  Pain Management Interventions: see MAR     Problem: Respiratory Compromise (Anesthesia/Analgesia, Neuraxial)  Goal: Effective Oxygenation and Ventilation  Outcome: Ongoing, Progressing     Problem: Sensorimotor Impairment (Anesthesia/Analgesia, Neuraxial)  Goal: Baseline Motor Function  Outcome: Ongoing, Progressing  Intervention: Optimize Sensorimotor Function  Recent Flowsheet Documentation  Taken 2024 1016 by Janel Knight RN  Safety Promotion/Fall Prevention: safety round/check completed  Taken 2024 0900 by Janel Knight RN  Safety Promotion/Fall Prevention: safety round/check completed     Problem: Urinary Retention (Anesthesia/Analgesia, Neuraxial)  Goal: Effective Urinary Elimination  Outcome: Ongoing, Progressing     Problem:  Fall Injury Risk  Goal: Absence of Fall, Infant Drop and Related Injury  Outcome: Ongoing, Progressing  Intervention: Promote Injury-Free Environment  Recent Flowsheet Documentation  Taken 2024 1016 by Janel Knight RN  Safety Promotion/Fall Prevention: safety round/check completed  Taken 2024 0900 by Janel Knight RN  Safety Promotion/Fall  Prevention: safety round/check completed     Problem: Skin Injury Risk Increased  Goal: Skin Health and Integrity  Outcome: Ongoing, Progressing

## 2024-05-26 NOTE — PROGRESS NOTES
2024    Name:Marysol Malhotra    MR#:[id number]     Progress Note:  Post-Op Day 2 S/P    HD:2    Subjective   33 y.o. yo Female  s/p CS at 39w0d doing well. Pain well controlled. Tolerating regular diet and having flatus. Lochia normal. Still sore mostly on the lateral aspects of incision.     Patient Active Problem List   Diagnosis    Previous  section    History of placenta abruption    Pregnancy    Short cervix affecting pregnancy    Maternal anemia in pregnancy, antepartum    SGA (small for gestational age), fetal, affecting care of mother, antepartum, third trimester, other fetus    Previous  section complicating pregnancy    Keloid scar of skin    S/P  section        Objective    Vitals  Temp:  Temp:  [98.1 °F (36.7 °C)-98.6 °F (37 °C)] 98.1 °F (36.7 °C)  Temp src: Oral  BP:  BP: (112-119)/(68-76) 112/76  Pulse:  Heart Rate:  [57-68] 57  RR:   Resp:  [16] 16  Weight: 76.2 kg (168 lb)  BMI:  Body mass index is 30.73 kg/m².      General Awake, alert, no distress  Abdomen Soft, non-distended, fundus firm,  below umbilicus, appropriately tender  Incision  Intact, no erythema or exudate  Extremities Calves NT bilaterally         I/O last 3 completed shifts:  In: -   Out: 1600 [Urine:1600]    LABS:   Lab Results   Component Value Date    WBC 12.82 (H) 2024    HGB 11.5 (L) 2024    HCT 34.1 2024    MCV 96.1 2024     2024       Infant: male       Assessment   1.  POD 2. Incision healing well, ambulating, tolerating PO. Still having some soreness at incision. Would like to stay another day.     Plan: Doing well.  Routine postoperative care      Previous  section complicating pregnancy    Previous  section    History of placenta abruption    Short cervix affecting pregnancy    Maternal anemia in pregnancy, antepartum    SGA (small for gestational age), fetal, affecting care of mother, antepartum, third trimester,  other fetus    Keloid scar of skin    S/P  section      Laura Avila MD  2024 14:12 EDT

## 2024-05-27 VITALS
HEART RATE: 66 BPM | HEIGHT: 62 IN | SYSTOLIC BLOOD PRESSURE: 129 MMHG | WEIGHT: 168 LBS | DIASTOLIC BLOOD PRESSURE: 77 MMHG | TEMPERATURE: 98.8 F | OXYGEN SATURATION: 97 % | RESPIRATION RATE: 16 BRPM | BODY MASS INDEX: 30.91 KG/M2

## 2024-05-27 RX ORDER — IBUPROFEN 600 MG/1
600 TABLET ORAL EVERY 6 HOURS PRN
Qty: 60 TABLET | Refills: 1 | Status: SHIPPED | OUTPATIENT
Start: 2024-05-27 | End: 2024-06-10

## 2024-05-27 RX ORDER — ACETAMINOPHEN 325 MG/1
650 TABLET ORAL EVERY 6 HOURS PRN
Qty: 60 TABLET | Refills: 1 | Status: SHIPPED | OUTPATIENT
Start: 2024-05-27 | End: 2024-06-10

## 2024-05-27 RX ADMIN — IBUPROFEN 600 MG: 600 TABLET, FILM COATED ORAL at 00:22

## 2024-05-27 RX ADMIN — SENNOSIDES AND DOCUSATE SODIUM 1 TABLET: 50; 8.6 TABLET ORAL at 08:44

## 2024-05-27 RX ADMIN — ACETAMINOPHEN 325MG 650 MG: 325 TABLET ORAL at 08:44

## 2024-05-27 RX ADMIN — IBUPROFEN 600 MG: 600 TABLET, FILM COATED ORAL at 06:51

## 2024-05-27 RX ADMIN — IBUPROFEN 600 MG: 600 TABLET, FILM COATED ORAL at 19:03

## 2024-05-27 RX ADMIN — ACETAMINOPHEN 325MG 650 MG: 325 TABLET ORAL at 14:49

## 2024-05-27 RX ADMIN — IBUPROFEN 600 MG: 600 TABLET, FILM COATED ORAL at 12:57

## 2024-05-27 NOTE — PROGRESS NOTES
[facility]   Obstetrics and Gynecology     2024    Name:Marysol Malhotra    MR#:[id number]     Progress Note:  Post-Op    HD:3    Subjective   33 y.o. yo Female  s/p CS at 39w0d doing well. Pain well controlled. Tolerating regular diet and having flatus. Lochia normal. Does not feel ready for discharge.    Patient Active Problem List   Diagnosis    Previous  section    History of placenta abruption    Pregnancy    Short cervix affecting pregnancy    Maternal anemia in pregnancy, antepartum    SGA (small for gestational age), fetal, affecting care of mother, antepartum, third trimester, other fetus    Previous  section complicating pregnancy    Keloid scar of skin    S/P  section        Objective    Vitals  Temp:  Temp:  [98 °F (36.7 °C)-98.1 °F (36.7 °C)] 98.1 °F (36.7 °C)  Temp src: Oral  BP:  BP: (112-119)/(71-80) 112/71  Pulse:  Heart Rate:  [63-87] 63  RR:   Resp:  [16] 16  Weight: 76.2 kg (168 lb)  BMI:  Body mass index is 30.73 kg/m².    Physical Exam  Vitals and nursing note reviewed.   Constitutional:       General: She is not in acute distress.     Appearance: Normal appearance.   Cardiovascular:      Pulses: Normal pulses.   Pulmonary:      Effort: Pulmonary effort is normal.   Abdominal:      General: There is no distension.      Palpations: Abdomen is soft.      Tenderness: There is no abdominal tenderness. There is no guarding or rebound.      Comments: Incision c/d/i   Musculoskeletal:         General: No swelling or tenderness.      Right lower leg: No edema.      Left lower leg: No edema.   Neurological:      Mental Status: She is alert and oriented to person, place, and time.   Psychiatric:         Mood and Affect: Mood normal.         Behavior: Behavior normal.         No intake/output data recorded.    LABS:  Results from last 7 days   Lab Units 24  0503 24  0525   WBC 10*3/mm3 12.82* 8.18   HEMOGLOBIN g/dL 11.5* 12.5    HEMATOCRIT % 34.1 37.7   PLATELETS 10*3/mm3 154 150     Results from last 7 days   Lab Units 24  0525   SODIUM mmol/L 134*   POTASSIUM mmol/L 4.2   CHLORIDE mmol/L 103   CO2 mmol/L 19.4*   BUN mg/dL 8   CREATININE mg/dL 0.44*   CALCIUM mg/dL 8.8   BILIRUBIN mg/dL <0.2   ALK PHOS U/L 113   ALT (SGPT) U/L 9   AST (SGOT) U/L 15   GLUCOSE mg/dL 91       Infant: male       Assessment    1.  POD#3     Previous  section complicating pregnancy    Previous  section    History of placenta abruption    Short cervix affecting pregnancy    Maternal anemia in pregnancy, antepartum    SGA (small for gestational age), fetal, affecting care of mother, antepartum, third trimester, other fetus    Keloid scar of skin    S/P  section      Plan:  Continue routine postpartum care  Plan discharge tomorrow    Briana Eli MD  2024 09:03 EDT

## 2024-05-27 NOTE — DISCHARGE SUMMARY
[facility]   Obstetrics and Gynecology    Section Discharge Summary    Date of Admission: 2024  Date of Discharge:        Patient: Marysol Malhotra      MR#:[id number]    Surgeon/OB: Meenu Villasenor MD    Discharge Diagnosis:    section at 39w0d, uncomplicated recovery    Previous  section complicating pregnancy    Previous  section    History of placenta abruption    Short cervix affecting pregnancy    Maternal anemia in pregnancy, antepartum    SGA (small for gestational age), fetal, affecting care of mother, antepartum, third trimester, other fetus    Keloid scar of skin    S/P  section        Procedures:  , Low Transverse     2024    8:07 AM      Anesthesia:  Spinal     Hospital Course  Patient is a 33 y.o. female  at 39w0d status post  section with uneventful postoperative recovery.  Patient was advanced to regular diet on postoperative day#1.  On discharge, ambulating, tolerating a regular diet without any difficulties and her incision is dry, clean and intact.     Infant:   male  fetus 3620 g (7 lb 15.7 oz)  with Apgar scores of 9 , 9  at five minutes.    Condition on Discharge:  Stable    Vital Signs  Temp:  [98 °F (36.7 °C)-98.8 °F (37.1 °C)] 98.8 °F (37.1 °C)  Heart Rate:  [63-76] 66  Resp:  [16] 16  BP: (112-129)/(71-77) 129/77    Results from last 7 days   Lab Units 24  0503 24  0525   WBC 10*3/mm3 12.82* 8.18   HEMOGLOBIN g/dL 11.5* 12.5   HEMATOCRIT % 34.1 37.7   PLATELETS 10*3/mm3 154 150     Results from last 7 days   Lab Units 24  0525   SODIUM mmol/L 134*   POTASSIUM mmol/L 4.2   CHLORIDE mmol/L 103   CO2 mmol/L 19.4*   BUN mg/dL 8   CREATININE mg/dL 0.44*   CALCIUM mg/dL 8.8   BILIRUBIN mg/dL <0.2   ALK PHOS U/L 113   ALT (SGPT) U/L 9   AST (SGOT) U/L 15   GLUCOSE mg/dL 91         Discharge Disposition  Home or Self Care    Discharge Medications     Your medication list        START taking  these medications        Instructions Last Dose Given Next Dose Due   acetaminophen 325 MG tablet  Commonly known as: TYLENOL      Take 2 tablets by mouth Every 6 (Six) Hours As Needed for Mild Pain.       ibuprofen 600 MG tablet  Commonly known as: ADVIL,MOTRIN      Take 1 tablet by mouth Every 6 (Six) Hours As Needed for Mild Pain.              CHANGE how you take these medications        Instructions Last Dose Given Next Dose Due   ferrous sulfate 325 (65 FE) MG tablet  What changed: when to take this      Take 1 tablet by mouth Daily With Breakfast.              CONTINUE taking these medications        Instructions Last Dose Given Next Dose Due   prenatal (CLASSIC) vitamin 28-0.8 MG tablet tablet  Generic drug: prenatal vitamin      Take  by mouth Daily.              STOP taking these medications      doxylamine 25 MG tablet  Commonly known as: UNISOM        Progesterone 200 MG capsule  Commonly known as: PROMETRIUM                  Where to Get Your Medications        These medications were sent to [facility] [id number] - [place], [place] - [address] AT [facility]. & [address] - [phone]  - [phone]   [address] [facility] [address], [place] [address]      Phone: [phone]   acetaminophen 325 MG tablet  ibuprofen 600 MG tablet           Discharge Diet: Regular    Follow-up Appointments  Future Appointments   Date Time Provider Department Center   6/10/2024  1:15 PM Meenu Villasenor MD [facility]  [facility]     Additional Instructions for the Follow-ups that You Need to Schedule       Call MD for problems / concerns.   As directed      Call for any problems with  1.  Heavy vaginal bleeding (greater than 2 pads an hours for 2 hours)  2.  Fever above 101.3  3.  Incisional redness  4.  Signs of Preeclampsia:  headache, visual changes or elevated blood pressure    Order Comments: Call for any problems with 1.  Heavy vaginal bleeding (greater than 2 pads an hours for 2 hours) 2.  Fever  above 101.3 3.  Incisional redness 4.  Signs of Preeclampsia:  headache, visual changes or elevated blood pressure                 Prenatal labs/vax:   Immunization History   Administered Date(s) Administered    Fluzone (or Fluarix & Flulaval for VFC) >6mos 03/31/2021, 11/17/2023    Tdap 08/06/2021, 03/06/2024       External Prenatal Results       Pregnancy Outside Results - Transcribed From Office Records - See Scanned Records For Details       Test Value Date Time    ABO  O  05/24/24 0525    Rh  Positive  05/24/24 0525    Antibody Screen  Negative  05/24/24 0525       Negative  01/18/24 1433       Negative  10/18/23 1144    Varicella IgG  1,805 index 10/18/23 1144    Rubella  2.74 index 10/18/23 1144    Hgb  11.5 g/dL 05/25/24 0503       12.5 g/dL 05/24/24 0525       11.0 g/dL 03/06/24 0905       12.1 g/dL 01/18/24 1433       12.2 g/dL 10/18/23 1144    Hct  34.1 % 05/25/24 0503       37.7 % 05/24/24 0525       32.6 % 03/06/24 0905       35.8 % 01/18/24 1433       36.3 % 10/18/23 1144    Glucose Fasting GTT       Glucose Tolerance Test 1 hour       Glucose Tolerance Test 3 hour       Gonorrhea (discrete)  Negative  10/18/23 1641    Chlamydia (discrete)  Negative  10/18/23 1641    RPR  Non Reactive  03/06/24 0905       Non Reactive  10/18/23 1144    VDRL       Syphilis Antibody       HBsAg  Negative  10/18/23 1144    Herpes Simplex Virus PCR       Herpes Simplex VIrus Culture       HIV  Non Reactive  10/18/23 1144    Hep C RNA Quant PCR       Hep C Antibody  Non Reactive  10/18/23 1144    AFP       Group B Strep  Negative  04/29/24 1100    GBS Susceptibility to Clindamycin       GBS Susceptibility to Erythromycin       Fetal Fibronectin       Genetic Testing, Maternal Blood                 Drug Screening       Test Value Date Time    NIPT        Urine Drug Screen       Amphetamine Screen       Barbiturate Screen       Benzodiazepine Screen       Methadone Screen       Phencyclidine Screen       Opiates Screen        THC Screen       Cocaine Screen       Propoxyphene Screen       Buprenorphine Screen       Methamphetamine Screen       Oxycodone Screen                 Legend    ^: Historical                              Briana Eli MD  5/27/2024  17:16 EDT

## 2024-05-27 NOTE — LACTATION NOTE
This note was copied from a baby's chart.  Sleeping when rounded. Per RN mom is pumping up to 37 ml of ebm and has supplemented with formula. LC number on board.

## 2024-05-27 NOTE — LACTATION NOTE
This note was copied from a baby's chart.  Mom just fed baby EBM and has some left in bottle baby did not take.  Educated on breast milk storage and to use within one hour if baby drank from the bottle.  Recommended giving in smaller amounts at a time to avoid wasting and can store in NICU if needed.  More storage bottles given.  Reports breasts are feeling full today but not engorged and has transitional milk.  Educated on symptoms and treatment for engorgement.  LC number on whiteboard.  Encouraged to call for any questions or concerns.

## 2024-06-10 ENCOUNTER — POSTPARTUM VISIT (OUTPATIENT)
Dept: OBSTETRICS AND GYNECOLOGY | Age: 34
End: 2024-06-10
Payer: COMMERCIAL

## 2024-06-10 VITALS
BODY MASS INDEX: 26.13 KG/M2 | SYSTOLIC BLOOD PRESSURE: 104 MMHG | WEIGHT: 142 LBS | DIASTOLIC BLOOD PRESSURE: 64 MMHG | HEIGHT: 62 IN

## 2024-06-10 DIAGNOSIS — Z98.891 S/P CESAREAN SECTION: ICD-10-CM

## 2024-06-10 DIAGNOSIS — L91.0 KELOID SCAR OF SKIN: ICD-10-CM

## 2024-06-10 PROBLEM — Z34.90 PREGNANCY: Status: RESOLVED | Noted: 2023-11-17 | Resolved: 2024-06-10

## 2024-06-10 PROBLEM — O99.019 MATERNAL ANEMIA IN PREGNANCY, ANTEPARTUM: Status: RESOLVED | Noted: 2024-03-07 | Resolved: 2024-06-10

## 2024-06-10 PROBLEM — O26.879 SHORT CERVIX AFFECTING PREGNANCY: Status: RESOLVED | Noted: 2024-01-15 | Resolved: 2024-06-10

## 2024-06-10 PROBLEM — O34.219 PREVIOUS CESAREAN SECTION COMPLICATING PREGNANCY: Status: RESOLVED | Noted: 2024-05-24 | Resolved: 2024-06-10

## 2024-06-10 PROBLEM — O36.5939 SGA (SMALL FOR GESTATIONAL AGE), FETAL, AFFECTING CARE OF MOTHER, ANTEPARTUM, THIRD TRIMESTER, OTHER FETUS: Status: RESOLVED | Noted: 2024-04-08 | Resolved: 2024-06-10

## 2024-06-10 PROCEDURE — 0503F POSTPARTUM CARE VISIT: CPT | Performed by: OBSTETRICS & GYNECOLOGY

## 2024-06-10 NOTE — PROGRESS NOTES
"Subjective   Marysol Malhotra is a 33 y.o. female who presents for a postpartum visit. She is 2 weeks postpartum following a low cervical transverse  section. I have fully reviewed the prenatal and intrapartum course. The delivery was at 39-0 gestational weeks. Outcome: repeat  section, low transverse incision. Anesthesia: spinal. Postpartum course has been uncomplicated. Baby's course has been uncomplicated. Baby is feeding by breast. Bleeding no bleeding. Bowel function is normal. Bladder function is normal. Patient is not sexually active. Contraception method is  undecided . Postpartum depression screening: negative.    The following portions of the patient's history were reviewed and updated as appropriate: allergies, current medications, past family history, past medical history, past social history, past surgical history, and problem list.    Review of Systems  Pertinent items are noted in HPI.    Objective   /64   Ht 157.5 cm (62\")   Wt 64.4 kg (142 lb)   LMP 2023 (Approximate)   Breastfeeding Yes   BMI 25.97 kg/m²    General:  alert, appears stated age, and cooperative   Abdomen: soft, non-tender; bowel sounds normal; no masses,  no organomegaly and Inc C/D/I no evidence of keloid     Assessment & Plan   postpartum exam. Pap smear not done at today's visit.    1. Contraception:  undecided   2. Previous keloid -no evidence of keloid scar today  3. Follow up in: 4 weeks or as needed.           "

## 2024-06-28 ENCOUNTER — TELEPHONE (OUTPATIENT)
Dept: OBSTETRICS AND GYNECOLOGY | Age: 34
End: 2024-06-28

## 2024-06-28 DIAGNOSIS — R21 RASH: Primary | ICD-10-CM

## 2024-06-28 RX ORDER — METHYLPREDNISOLONE 4 MG/1
TABLET ORAL
Qty: 21 TABLET | Refills: 0 | Status: SHIPPED | OUTPATIENT
Start: 2024-06-28 | End: 2024-07-08

## 2024-06-28 NOTE — TELEPHONE ENCOUNTER
Caller: Marysol Malhotra     Relationship: SELF    Best call back number: [phone] / LVM    What is your medical concern? POST PARTUM RASH / HIVES    How long has this issue been going on? 2 WEEKS    Is your provider already aware of this issue? NO    Have you been treated for this issue? YES AT THE [facility]    PT HAS BEEN SEEN @ THE [facility] 2 WEEKS AGO FOR RASH / HIVES AND WAS TOLD THAT IT COULD BE POSTOARTUM RASH - HUB WT TO NURSES LINE AND WAS TOLD TO SEND A TE    PLEASE CALL THE PT TO DISCUSS    THANK YOU!

## 2024-06-28 NOTE — TELEPHONE ENCOUNTER
Pt c/o hives, raised bumps all over arms and legs and moving to her face and top of her hands. [facility], rx steroid cream not working, has been using OTC cream that helps with itching but rash still spreading and very itchy. Rash started around 6/10.

## 2024-07-08 ENCOUNTER — POSTPARTUM VISIT (OUTPATIENT)
Dept: OBSTETRICS AND GYNECOLOGY | Age: 34
End: 2024-07-08
Payer: COMMERCIAL

## 2024-07-08 VITALS — HEIGHT: 62 IN | WEIGHT: 136 LBS | BODY MASS INDEX: 25.03 KG/M2

## 2024-07-08 DIAGNOSIS — N89.8 VAGINAL DISCHARGE: ICD-10-CM

## 2024-07-08 DIAGNOSIS — Z98.891 S/P CESAREAN SECTION: Primary | ICD-10-CM

## 2024-07-08 PROCEDURE — 0503F POSTPARTUM CARE VISIT: CPT | Performed by: OBSTETRICS & GYNECOLOGY

## 2024-07-08 RX ORDER — FEXOFENADINE HCL 60 MG/1
60 TABLET, FILM COATED ORAL DAILY
COMMUNITY

## 2024-07-08 NOTE — PROGRESS NOTES
"Subjective   Marysol Malhotra is a 33 y.o. female who presents for a postpartum visit. She is 6 weeks postpartum following a low cervical transverse  section. I have fully reviewed the prenatal and intrapartum course. The delivery was at 39 gestational weeks. Outcome: repeat  section, low transverse incision. Anesthesia: spinal. Postpartum course has been complicated severe rash. Baby's course has been uncomplicated. Baby is feeding by breast. Bleeding no bleeding. Bowel function is normal. Bladder function is normal. Patient is not sexually active. Contraception method is  undecided . Postpartum depression screening: positive.    The following portions of the patient's history were reviewed and updated as appropriate: allergies, current medications, past family history, past medical history, past social history, past surgical history, and problem list.    Review of Systems  Pertinent items are noted in HPI.    Objective   Ht 157.5 cm (62\")   Wt 61.7 kg (136 lb)   LMP 2023 (Approximate)   Breastfeeding Yes   BMI 24.87 kg/m²    General:  alert, appears stated age, and cooperative   Abdomen: soft, non-tender; bowel sounds normal; no masses,  no organomegaly and Inc healing well      Assessment & Plan    postpartum exam. Pap smear not done at today's visit.    1. Contraception:  undecided   2. Rash - referred to Derm   3. Follow up in: 4 weeks or as needed.  4. PPD - start Zoloft   5. Vaginal discharge - remnant of cerclage removed - Nuswab performed            "

## 2024-07-11 DIAGNOSIS — B96.89 BV (BACTERIAL VAGINOSIS): Primary | ICD-10-CM

## 2024-07-11 DIAGNOSIS — N76.0 BV (BACTERIAL VAGINOSIS): Primary | ICD-10-CM

## 2024-07-11 LAB
A VAGINAE DNA VAG QL NAA+PROBE: ABNORMAL SCORE
BVAB2 DNA VAG QL NAA+PROBE: ABNORMAL SCORE
C ALBICANS DNA VAG QL NAA+PROBE: NEGATIVE
C GLABRATA DNA VAG QL NAA+PROBE: NEGATIVE
C TRACH DNA SPEC QL NAA+PROBE: NEGATIVE
MEGA1 DNA VAG QL NAA+PROBE: ABNORMAL SCORE
N GONORRHOEA DNA VAG QL NAA+PROBE: NEGATIVE
T VAGINALIS DNA VAG QL NAA+PROBE: NEGATIVE

## 2024-07-11 RX ORDER — METRONIDAZOLE 500 MG/1
500 TABLET ORAL 2 TIMES DAILY
Qty: 14 TABLET | Refills: 0 | Status: SHIPPED | OUTPATIENT
Start: 2024-07-11 | End: 2024-07-18

## 2024-08-05 ENCOUNTER — POSTPARTUM VISIT (OUTPATIENT)
Dept: OBSTETRICS AND GYNECOLOGY | Age: 34
End: 2024-08-05
Payer: COMMERCIAL

## 2024-08-05 VITALS
BODY MASS INDEX: 24.29 KG/M2 | SYSTOLIC BLOOD PRESSURE: 104 MMHG | WEIGHT: 132 LBS | HEIGHT: 62 IN | DIASTOLIC BLOOD PRESSURE: 68 MMHG

## 2024-08-05 DIAGNOSIS — Z98.891 S/P CESAREAN SECTION: Primary | ICD-10-CM

## 2024-08-05 PROBLEM — L91.0 KELOID SCAR OF SKIN: Status: RESOLVED | Noted: 2024-05-24 | Resolved: 2024-08-05

## 2024-08-05 PROCEDURE — 99213 OFFICE O/P EST LOW 20 MIN: CPT | Performed by: OBSTETRICS & GYNECOLOGY

## 2024-08-05 RX ORDER — DUPILUMAB 300 MG/2ML
INJECTION, SOLUTION SUBCUTANEOUS
COMMUNITY

## 2024-08-05 NOTE — PROGRESS NOTES
"Subjective   Marysol Malhotra is a 33 y.o. female who presents for a postpartum visit. She is 10 weeks postpartum following a low cervical transverse  section. I have fully reviewed the prenatal and intrapartum course. The delivery was at 39-0  gestational weeks. Outcome: repeat  section, low transverse incision. Anesthesia: spinal. Postpartum course has been complicated by extensive eczema. Baby's course has been uncomplicated. Baby is feeding by breast. Bleeding thin lochia. Bowel function is normal. Bladder function is normal. Patient is not sexually active. Contraception method is none. Postpartum depression screening: negative.    The following portions of the patient's history were reviewed and updated as appropriate: allergies, current medications, past family history, past medical history, past social history, past surgical history, and problem list.    Review of Systems  Pertinent items are noted in HPI.    Objective   /68   Ht 157.5 cm (62\")   Wt 59.9 kg (132 lb)   LMP 2024 (Approximate)   Breastfeeding Yes   BMI 24.14 kg/m²    General:  alert, appears stated age, and cooperative   Abdomen: soft, non-tender; bowel sounds normal; no masses,  no organomegaly and incision clean dry and intact     Assessment & Plan   postpartum exam. Pap smear not done at today's visit.    1. Contraception: none  2. PPD -plans to start Zoloft this weekend  3. Follow up in: 6 months or as needed.           "

## 2024-11-20 ENCOUNTER — TELEPHONE (OUTPATIENT)
Dept: OBSTETRICS AND GYNECOLOGY | Age: 34
End: 2024-11-20
Payer: COMMERCIAL

## 2025-02-26 ENCOUNTER — OFFICE VISIT (OUTPATIENT)
Dept: OBSTETRICS AND GYNECOLOGY | Age: 35
End: 2025-02-26
Payer: COMMERCIAL

## 2025-02-26 VITALS
BODY MASS INDEX: 23.55 KG/M2 | WEIGHT: 128 LBS | SYSTOLIC BLOOD PRESSURE: 100 MMHG | HEIGHT: 62 IN | DIASTOLIC BLOOD PRESSURE: 60 MMHG

## 2025-02-26 DIAGNOSIS — Z01.419 ENCOUNTER FOR GYNECOLOGICAL EXAMINATION WITHOUT ABNORMAL FINDING: Primary | ICD-10-CM

## 2025-02-26 DIAGNOSIS — Z11.51 SPECIAL SCREENING EXAMINATION FOR HUMAN PAPILLOMAVIRUS (HPV): ICD-10-CM

## 2025-02-26 DIAGNOSIS — Z12.4 SCREENING FOR MALIGNANT NEOPLASM OF THE CERVIX: ICD-10-CM

## 2025-02-26 PROBLEM — Z98.891 S/P CESAREAN SECTION: Status: RESOLVED | Noted: 2024-05-24 | Resolved: 2025-02-26

## 2025-02-26 NOTE — PROGRESS NOTES
"Subjective   Marysol Malhotra is a 34 y.o. female presents for annual visit today , last pap 11/29/21 neg , still breastfeeding , periods started 3 months ago , contraception none . No problems today .  Unfortunately keloid returned.  Patient is considering another baby at the some point.   not on board yet.    Gynecologic Exam  The patient's pertinent negatives include no pelvic pain or vaginal discharge. Pertinent negatives include no abdominal pain, chills, dysuria or fever.       The following portions of the patient's history were reviewed and updated as appropriate: allergies, current medications, past family history, past medical history, past social history, past surgical history, and problem list.    Review of Systems   Constitutional:  Negative for chills, fatigue and fever.   Gastrointestinal:  Negative for abdominal distention and abdominal pain.   Genitourinary:  Negative for dysuria, pelvic pain, vaginal bleeding, vaginal discharge and vaginal pain.   All other systems reviewed and are negative.    /60   Ht 157.5 cm (62\")   Wt 58.1 kg (128 lb)   LMP 02/25/2025 (Exact Date) Comment: pumping  Breastfeeding Yes   BMI 23.41 kg/m²     Objective   Physical Exam  Vitals and nursing note reviewed.   Constitutional:       Appearance: Normal appearance. She is well-developed and normal weight.   Neck:      Thyroid: No thyromegaly.   Pulmonary:      Effort: Pulmonary effort is normal.   Chest:   Breasts:     Right: No mass, nipple discharge, skin change or tenderness.      Left: No mass, nipple discharge, skin change or tenderness.   Abdominal:      General: There is no distension.      Palpations: Abdomen is soft.      Tenderness: There is no abdominal tenderness.   Genitourinary:     General: Normal vulva.      Exam position: Lithotomy position.      Labia:         Right: No rash or lesion.         Left: No rash or lesion.       Vagina: Normal. No vaginal discharge or bleeding.      Cervix: No " friability or lesion.      Uterus: Not enlarged and not tender.       Adnexa:         Right: No mass or tenderness.          Left: No mass or tenderness.     Musculoskeletal:         General: Normal range of motion.      Cervical back: Normal range of motion.   Skin:     General: Skin is warm and dry.      Findings: No rash.   Neurological:      Mental Status: She is alert and oriented to person, place, and time.   Psychiatric:         Mood and Affect: Mood normal.         Behavior: Behavior normal.           Assessment & Plan   Diagnoses and all orders for this visit:    1. Encounter for gynecological examination without abnormal finding (Primary)        Counseling was given to patient for the following topics: instructions for management, risks and benefits of treatment options, importance of treatment compliance, and self-breast exams  .   Return in about 1 year (around 2/26/2026) for Annual physical.

## 2025-03-03 LAB
CYTOLOGIST CVX/VAG CYTO: NORMAL
CYTOLOGY CVX/VAG DOC CYTO: NORMAL
CYTOLOGY CVX/VAG DOC THIN PREP: NORMAL
DX ICD CODE: NORMAL
HPV I/H RISK 4 DNA CVX QL PROBE+SIG AMP: NEGATIVE
OTHER STN SPEC: NORMAL
SERVICE CMNT-IMP: NORMAL
STAT OF ADQ CVX/VAG CYTO-IMP: NORMAL

## (undated) DEVICE — ANTIBACTERIAL UNDYED BRAIDED (POLYGLACTIN 910), SYNTHETIC ABSORBABLE SUTURE: Brand: COATED VICRYL

## (undated) DEVICE — STPLR SKIN SUBCUTICULAR INSORB 2030

## (undated) DEVICE — STRAP STIRUP WO/ RNG

## (undated) DEVICE — LOU D & C HYSTEROSCOPY: Brand: MEDLINE INDUSTRIES, INC.

## (undated) DEVICE — GLV SURG BIOGEL LTX PF 6 1/2

## (undated) DEVICE — MEDI-VAC YANKAUER SUCTION HANDLE W/BULBOUS TIP: Brand: CARDINAL HEALTH

## (undated) DEVICE — GLV CARPAL TUNNEL IMPACTO 1/2FNGR SYNTH LG

## (undated) DEVICE — SUT MNCRYL 0/0 CTX 36IN Y398H

## (undated) DEVICE — SUT MNCRYL 3/0 CT1 36 IN Y944H

## (undated) DEVICE — SOL IRR H2O BTL 1000ML STRL

## (undated) DEVICE — 3M™ TEGADERM™ TRANSPARENT FILM DRESSING FRAME STYLE, 1627, 4 IN X 10 IN (10 CM X 25 CM), 20/CT 4CT/CASE: Brand: 3M™ TEGADERM™